# Patient Record
Sex: FEMALE | Race: WHITE | NOT HISPANIC OR LATINO
[De-identification: names, ages, dates, MRNs, and addresses within clinical notes are randomized per-mention and may not be internally consistent; named-entity substitution may affect disease eponyms.]

---

## 2017-08-14 ENCOUNTER — APPOINTMENT (OUTPATIENT)
Dept: OBGYN | Facility: CLINIC | Age: 72
End: 2017-08-14
Payer: MEDICARE

## 2017-08-14 VITALS
SYSTOLIC BLOOD PRESSURE: 134 MMHG | DIASTOLIC BLOOD PRESSURE: 70 MMHG | HEIGHT: 65 IN | BODY MASS INDEX: 24.66 KG/M2 | WEIGHT: 148 LBS

## 2017-08-14 DIAGNOSIS — N95.2 POSTMENOPAUSAL ATROPHIC VAGINITIS: ICD-10-CM

## 2017-08-14 DIAGNOSIS — Z87.448 PERSONAL HISTORY OF OTHER DISEASES OF URINARY SYSTEM: ICD-10-CM

## 2017-08-14 PROCEDURE — 82270 OCCULT BLOOD FECES: CPT

## 2017-08-14 PROCEDURE — G0101: CPT

## 2017-08-14 PROCEDURE — 81003 URINALYSIS AUTO W/O SCOPE: CPT | Mod: QW

## 2017-08-16 LAB
CYTOLOGY CVX/VAG DOC THIN PREP: NORMAL
DATE COLLECTED: NORMAL
HEMOCCULT SP1 STL QL: NEGATIVE
QUALITY CONTROL: YES

## 2018-10-30 ENCOUNTER — APPOINTMENT (OUTPATIENT)
Dept: OBGYN | Facility: CLINIC | Age: 73
End: 2018-10-30
Payer: MEDICARE

## 2018-10-30 VITALS
TEMPERATURE: 98.3 F | DIASTOLIC BLOOD PRESSURE: 70 MMHG | WEIGHT: 157 LBS | OXYGEN SATURATION: 98 % | HEIGHT: 65 IN | SYSTOLIC BLOOD PRESSURE: 134 MMHG | BODY MASS INDEX: 26.16 KG/M2

## 2018-10-30 DIAGNOSIS — N95.2 POSTMENOPAUSAL ATROPHIC VAGINITIS: ICD-10-CM

## 2018-10-30 DIAGNOSIS — N81.11 CYSTOCELE, MIDLINE: ICD-10-CM

## 2018-10-30 LAB
DATE COLLECTED: NORMAL
HEMOCCULT SP1 STL QL: NEGATIVE

## 2018-10-30 PROCEDURE — 99214 OFFICE O/P EST MOD 30 MIN: CPT

## 2018-10-30 PROCEDURE — 82270 OCCULT BLOOD FECES: CPT

## 2018-11-11 LAB — CYTOLOGY CVX/VAG DOC THIN PREP: NORMAL

## 2019-06-30 ENCOUNTER — NON-APPOINTMENT (OUTPATIENT)
Age: 74
End: 2019-06-30

## 2021-05-27 DIAGNOSIS — Z82.49 FAMILY HISTORY OF ISCHEMIC HEART DISEASE AND OTHER DISEASES OF THE CIRCULATORY SYSTEM: ICD-10-CM

## 2021-05-27 DIAGNOSIS — Z92.89 PERSONAL HISTORY OF OTHER MEDICAL TREATMENT: ICD-10-CM

## 2021-05-27 DIAGNOSIS — A04.8 OTHER SPECIFIED BACTERIAL INTESTINAL INFECTIONS: ICD-10-CM

## 2021-05-27 DIAGNOSIS — Z85.828 PERSONAL HISTORY OF OTHER MALIGNANT NEOPLASM OF SKIN: ICD-10-CM

## 2021-05-27 DIAGNOSIS — Z80.3 FAMILY HISTORY OF MALIGNANT NEOPLASM OF BREAST: ICD-10-CM

## 2021-05-27 DIAGNOSIS — Z81.8 FAMILY HISTORY OF OTHER MENTAL AND BEHAVIORAL DISORDERS: ICD-10-CM

## 2021-05-27 DIAGNOSIS — Z83.3 FAMILY HISTORY OF DIABETES MELLITUS: ICD-10-CM

## 2021-05-27 RX ORDER — VITAMIN K2 90 MCG
125 MCG CAPSULE ORAL
Refills: 0 | Status: ACTIVE | COMMUNITY
Start: 2021-05-27

## 2021-07-01 ENCOUNTER — RX RENEWAL (OUTPATIENT)
Age: 76
End: 2021-07-01

## 2021-07-28 ENCOUNTER — NON-APPOINTMENT (OUTPATIENT)
Age: 76
End: 2021-07-28

## 2021-07-29 ENCOUNTER — APPOINTMENT (OUTPATIENT)
Dept: FAMILY MEDICINE | Facility: CLINIC | Age: 76
End: 2021-07-29

## 2021-07-29 ENCOUNTER — RESULT REVIEW (OUTPATIENT)
Age: 76
End: 2021-07-29

## 2021-07-29 ENCOUNTER — INPATIENT (INPATIENT)
Facility: HOSPITAL | Age: 76
LOS: 1 days | Discharge: HOME | DRG: 418 | End: 2021-07-31
Attending: SURGERY | Admitting: SURGERY
Payer: MEDICARE

## 2021-07-29 VITALS
DIASTOLIC BLOOD PRESSURE: 79 MMHG | WEIGHT: 145.06 LBS | HEART RATE: 69 BPM | RESPIRATION RATE: 18 BRPM | TEMPERATURE: 99 F | OXYGEN SATURATION: 95 % | HEIGHT: 65 IN | SYSTOLIC BLOOD PRESSURE: 135 MMHG

## 2021-07-29 DIAGNOSIS — K81.9 CHOLECYSTITIS, UNSPECIFIED: ICD-10-CM

## 2021-07-29 LAB
ALBUMIN SERPL ELPH-MCNC: 2.9 G/DL — LOW (ref 3.3–5)
ALP SERPL-CCNC: 72 U/L — SIGNIFICANT CHANGE UP (ref 40–120)
ALT FLD-CCNC: 21 U/L — SIGNIFICANT CHANGE UP (ref 12–78)
ANION GAP SERPL CALC-SCNC: 8 MMOL/L — SIGNIFICANT CHANGE UP (ref 5–17)
APPEARANCE UR: CLEAR — SIGNIFICANT CHANGE UP
APTT BLD: 30.7 SEC — SIGNIFICANT CHANGE UP (ref 27.5–35.5)
AST SERPL-CCNC: 15 U/L — SIGNIFICANT CHANGE UP (ref 15–37)
BASOPHILS # BLD AUTO: 0.03 K/UL — SIGNIFICANT CHANGE UP (ref 0–0.2)
BASOPHILS NFR BLD AUTO: 0.2 % — SIGNIFICANT CHANGE UP (ref 0–2)
BILIRUB SERPL-MCNC: 1.4 MG/DL — HIGH (ref 0.2–1.2)
BILIRUB UR-MCNC: NEGATIVE — SIGNIFICANT CHANGE UP
BUN SERPL-MCNC: 12 MG/DL — SIGNIFICANT CHANGE UP (ref 7–23)
CALCIUM SERPL-MCNC: 9.1 MG/DL — SIGNIFICANT CHANGE UP (ref 8.5–10.1)
CHLORIDE SERPL-SCNC: 98 MMOL/L — SIGNIFICANT CHANGE UP (ref 96–108)
CO2 SERPL-SCNC: 27 MMOL/L — SIGNIFICANT CHANGE UP (ref 22–31)
COLOR SPEC: YELLOW — SIGNIFICANT CHANGE UP
CREAT SERPL-MCNC: 1.05 MG/DL — SIGNIFICANT CHANGE UP (ref 0.5–1.3)
DIFF PNL FLD: ABNORMAL
EOSINOPHIL # BLD AUTO: 0 K/UL — SIGNIFICANT CHANGE UP (ref 0–0.5)
EOSINOPHIL NFR BLD AUTO: 0 % — SIGNIFICANT CHANGE UP (ref 0–6)
GLUCOSE SERPL-MCNC: 140 MG/DL — HIGH (ref 70–99)
GLUCOSE UR QL: NEGATIVE MG/DL — SIGNIFICANT CHANGE UP
HCT VFR BLD CALC: 46.4 % — HIGH (ref 34.5–45)
HGB BLD-MCNC: 15.8 G/DL — HIGH (ref 11.5–15.5)
IMM GRANULOCYTES NFR BLD AUTO: 0.6 % — SIGNIFICANT CHANGE UP (ref 0–1.5)
INR BLD: 1.18 RATIO — HIGH (ref 0.88–1.16)
KETONES UR-MCNC: ABNORMAL
LACTATE SERPL-SCNC: 1.4 MMOL/L — SIGNIFICANT CHANGE UP (ref 0.7–2)
LEUKOCYTE ESTERASE UR-ACNC: NEGATIVE — SIGNIFICANT CHANGE UP
LIDOCAIN IGE QN: 44 U/L — LOW (ref 73–393)
LYMPHOCYTES # BLD AUTO: 0.49 K/UL — LOW (ref 1–3.3)
LYMPHOCYTES # BLD AUTO: 3.8 % — LOW (ref 13–44)
MCHC RBC-ENTMCNC: 30.6 PG — SIGNIFICANT CHANGE UP (ref 27–34)
MCHC RBC-ENTMCNC: 34.1 GM/DL — SIGNIFICANT CHANGE UP (ref 32–36)
MCV RBC AUTO: 89.9 FL — SIGNIFICANT CHANGE UP (ref 80–100)
MONOCYTES # BLD AUTO: 0.74 K/UL — SIGNIFICANT CHANGE UP (ref 0–0.9)
MONOCYTES NFR BLD AUTO: 5.8 % — SIGNIFICANT CHANGE UP (ref 2–14)
NEUTROPHILS # BLD AUTO: 11.52 K/UL — HIGH (ref 1.8–7.4)
NEUTROPHILS NFR BLD AUTO: 89.6 % — HIGH (ref 43–77)
NITRITE UR-MCNC: NEGATIVE — SIGNIFICANT CHANGE UP
PH UR: 7 — SIGNIFICANT CHANGE UP (ref 5–8)
PLATELET # BLD AUTO: 130 K/UL — LOW (ref 150–400)
POTASSIUM SERPL-MCNC: 3 MMOL/L — LOW (ref 3.5–5.3)
POTASSIUM SERPL-SCNC: 3 MMOL/L — LOW (ref 3.5–5.3)
PROT SERPL-MCNC: 6.8 GM/DL — SIGNIFICANT CHANGE UP (ref 6–8.3)
PROT UR-MCNC: 30 MG/DL
PROTHROM AB SERPL-ACNC: 13.7 SEC — HIGH (ref 10.6–13.6)
RBC # BLD: 5.16 M/UL — SIGNIFICANT CHANGE UP (ref 3.8–5.2)
RBC # FLD: 12.7 % — SIGNIFICANT CHANGE UP (ref 10.3–14.5)
SARS-COV-2 RNA SPEC QL NAA+PROBE: SIGNIFICANT CHANGE UP
SODIUM SERPL-SCNC: 133 MMOL/L — LOW (ref 135–145)
SP GR SPEC: 1.01 — SIGNIFICANT CHANGE UP (ref 1.01–1.02)
TROPONIN I SERPL-MCNC: <0.015 NG/ML — SIGNIFICANT CHANGE UP (ref 0.01–0.04)
UROBILINOGEN FLD QL: 1 MG/DL
WBC # BLD: 12.86 K/UL — HIGH (ref 3.8–10.5)
WBC # FLD AUTO: 12.86 K/UL — HIGH (ref 3.8–10.5)

## 2021-07-29 PROCEDURE — 86769 SARS-COV-2 COVID-19 ANTIBODY: CPT

## 2021-07-29 PROCEDURE — 93010 ELECTROCARDIOGRAM REPORT: CPT

## 2021-07-29 PROCEDURE — 88304 TISSUE EXAM BY PATHOLOGIST: CPT

## 2021-07-29 PROCEDURE — 80053 COMPREHEN METABOLIC PANEL: CPT

## 2021-07-29 PROCEDURE — 85025 COMPLETE CBC W/AUTO DIFF WBC: CPT

## 2021-07-29 PROCEDURE — 86900 BLOOD TYPING SEROLOGIC ABO: CPT

## 2021-07-29 PROCEDURE — 80048 BASIC METABOLIC PNL TOTAL CA: CPT

## 2021-07-29 PROCEDURE — 82248 BILIRUBIN DIRECT: CPT

## 2021-07-29 PROCEDURE — 36415 COLL VENOUS BLD VENIPUNCTURE: CPT

## 2021-07-29 PROCEDURE — 99291 CRITICAL CARE FIRST HOUR: CPT

## 2021-07-29 PROCEDURE — 88304 TISSUE EXAM BY PATHOLOGIST: CPT | Mod: 26

## 2021-07-29 PROCEDURE — 86850 RBC ANTIBODY SCREEN: CPT

## 2021-07-29 PROCEDURE — 74177 CT ABD & PELVIS W/CONTRAST: CPT | Mod: 26,MA

## 2021-07-29 PROCEDURE — 86803 HEPATITIS C AB TEST: CPT

## 2021-07-29 PROCEDURE — 86901 BLOOD TYPING SEROLOGIC RH(D): CPT

## 2021-07-29 RX ORDER — PIPERACILLIN AND TAZOBACTAM 4; .5 G/20ML; G/20ML
3.38 INJECTION, POWDER, LYOPHILIZED, FOR SOLUTION INTRAVENOUS EVERY 8 HOURS
Refills: 0 | Status: DISCONTINUED | OUTPATIENT
Start: 2021-07-29 | End: 2021-07-31

## 2021-07-29 RX ORDER — SODIUM CHLORIDE 9 MG/ML
1000 INJECTION, SOLUTION INTRAVENOUS
Refills: 0 | Status: DISCONTINUED | OUTPATIENT
Start: 2021-07-29 | End: 2021-07-29

## 2021-07-29 RX ORDER — HEPARIN SODIUM 5000 [USP'U]/ML
5000 INJECTION INTRAVENOUS; SUBCUTANEOUS EVERY 8 HOURS
Refills: 0 | Status: DISCONTINUED | OUTPATIENT
Start: 2021-07-29 | End: 2021-07-30

## 2021-07-29 RX ORDER — SODIUM CHLORIDE 9 MG/ML
1000 INJECTION, SOLUTION INTRAVENOUS
Refills: 0 | Status: DISCONTINUED | OUTPATIENT
Start: 2021-07-29 | End: 2021-07-31

## 2021-07-29 RX ORDER — SODIUM CHLORIDE 9 MG/ML
1000 INJECTION INTRAMUSCULAR; INTRAVENOUS; SUBCUTANEOUS ONCE
Refills: 0 | Status: COMPLETED | OUTPATIENT
Start: 2021-07-29 | End: 2021-07-29

## 2021-07-29 RX ORDER — CHOLECALCIFEROL (VITAMIN D3) 125 MCG
1 CAPSULE ORAL
Qty: 0 | Refills: 0 | DISCHARGE

## 2021-07-29 RX ORDER — HYDROMORPHONE HYDROCHLORIDE 2 MG/ML
1 INJECTION INTRAMUSCULAR; INTRAVENOUS; SUBCUTANEOUS EVERY 4 HOURS
Refills: 0 | Status: DISCONTINUED | OUTPATIENT
Start: 2021-07-29 | End: 2021-07-30

## 2021-07-29 RX ORDER — HYDROMORPHONE HYDROCHLORIDE 2 MG/ML
1 INJECTION INTRAMUSCULAR; INTRAVENOUS; SUBCUTANEOUS EVERY 4 HOURS
Refills: 0 | Status: DISCONTINUED | OUTPATIENT
Start: 2021-07-29 | End: 2021-07-31

## 2021-07-29 RX ORDER — ACETAMINOPHEN 500 MG
650 TABLET ORAL EVERY 4 HOURS
Refills: 0 | Status: DISCONTINUED | OUTPATIENT
Start: 2021-07-29 | End: 2021-07-31

## 2021-07-29 RX ORDER — POTASSIUM CHLORIDE 20 MEQ
10 PACKET (EA) ORAL ONCE
Refills: 0 | Status: COMPLETED | OUTPATIENT
Start: 2021-07-29 | End: 2021-07-29

## 2021-07-29 RX ORDER — PIPERACILLIN AND TAZOBACTAM 4; .5 G/20ML; G/20ML
3.38 INJECTION, POWDER, LYOPHILIZED, FOR SOLUTION INTRAVENOUS ONCE
Refills: 0 | Status: COMPLETED | OUTPATIENT
Start: 2021-07-29 | End: 2021-07-29

## 2021-07-29 RX ORDER — FENTANYL CITRATE 50 UG/ML
50 INJECTION INTRAVENOUS ONCE
Refills: 0 | Status: DISCONTINUED | OUTPATIENT
Start: 2021-07-29 | End: 2021-07-29

## 2021-07-29 RX ORDER — OXYCODONE AND ACETAMINOPHEN 5; 325 MG/1; MG/1
1 TABLET ORAL EVERY 4 HOURS
Refills: 0 | Status: DISCONTINUED | OUTPATIENT
Start: 2021-07-29 | End: 2021-07-31

## 2021-07-29 RX ORDER — TRIAMTERENE/HYDROCHLOROTHIAZID 75 MG-50MG
1 TABLET ORAL
Qty: 0 | Refills: 0 | DISCHARGE

## 2021-07-29 RX ORDER — ONDANSETRON 8 MG/1
4 TABLET, FILM COATED ORAL EVERY 6 HOURS
Refills: 0 | Status: DISCONTINUED | OUTPATIENT
Start: 2021-07-29 | End: 2021-07-31

## 2021-07-29 RX ORDER — TRIAMTERENE/HYDROCHLOROTHIAZID 75 MG-50MG
1 TABLET ORAL DAILY
Refills: 0 | Status: DISCONTINUED | OUTPATIENT
Start: 2021-07-29 | End: 2021-07-31

## 2021-07-29 RX ORDER — PIPERACILLIN AND TAZOBACTAM 4; .5 G/20ML; G/20ML
3.38 INJECTION, POWDER, LYOPHILIZED, FOR SOLUTION INTRAVENOUS EVERY 8 HOURS
Refills: 0 | Status: DISCONTINUED | OUTPATIENT
Start: 2021-07-29 | End: 2021-07-29

## 2021-07-29 RX ORDER — ENOXAPARIN SODIUM 100 MG/ML
40 INJECTION SUBCUTANEOUS DAILY
Refills: 0 | Status: DISCONTINUED | OUTPATIENT
Start: 2021-07-29 | End: 2021-07-31

## 2021-07-29 RX ADMIN — FENTANYL CITRATE 50 MICROGRAM(S): 50 INJECTION INTRAVENOUS at 11:54

## 2021-07-29 RX ADMIN — SODIUM CHLORIDE 1000 MILLILITER(S): 9 INJECTION INTRAMUSCULAR; INTRAVENOUS; SUBCUTANEOUS at 11:54

## 2021-07-29 RX ADMIN — PIPERACILLIN AND TAZOBACTAM 25 GRAM(S): 4; .5 INJECTION, POWDER, LYOPHILIZED, FOR SOLUTION INTRAVENOUS at 20:09

## 2021-07-29 RX ADMIN — SODIUM CHLORIDE 75 MILLILITER(S): 9 INJECTION, SOLUTION INTRAVENOUS at 18:48

## 2021-07-29 RX ADMIN — PIPERACILLIN AND TAZOBACTAM 200 GRAM(S): 4; .5 INJECTION, POWDER, LYOPHILIZED, FOR SOLUTION INTRAVENOUS at 11:54

## 2021-07-29 RX ADMIN — FENTANYL CITRATE 50 MICROGRAM(S): 50 INJECTION INTRAVENOUS at 10:49

## 2021-07-29 RX ADMIN — SODIUM CHLORIDE 1000 MILLILITER(S): 9 INJECTION INTRAMUSCULAR; INTRAVENOUS; SUBCUTANEOUS at 10:50

## 2021-07-29 RX ADMIN — Medication 100 MILLIEQUIVALENT(S): at 10:50

## 2021-07-29 RX ADMIN — HEPARIN SODIUM 5000 UNIT(S): 5000 INJECTION INTRAVENOUS; SUBCUTANEOUS at 21:38

## 2021-07-29 RX ADMIN — Medication 10 MILLIEQUIVALENT(S): at 11:54

## 2021-07-29 NOTE — ED PROVIDER NOTE - NS ED ROS FT
Constitutional: No fever or chills  Eyes: No visual changes  HEENT: No throat pain  CV: No chest pain  Resp: No SOB no cough  GI: +abd pain, nausea or vomiting. No diarrhea  : No dysuria  MSK: No musculoskeletal pain  Skin: No rash  Neuro: No headache

## 2021-07-29 NOTE — ED PROVIDER NOTE - OBJECTIVE STATEMENT
76 y/o female with a PMHx of  HTN presents to the ED c/o right sided abd pain since 07/27. States  pain  is on  the right with associated  nausea and vomiting. No bilious or blood in vomit.  Pain was  intermittent but now constant, dull  and non exacerbating with no radiation . Denies prior similar symptoms. Denies diarrhea. No prior abd surgeries. 76 y/o female with a PMHx of  HTN presents to the ED c/o right sided abd pain since 07/27. States  pain  is on  the right with associated  nausea and vomiting. No bilious or blood in vomit.  Pain was  intermittent but now constant, dull  and no exacerbating or alleviating factors, no radiation . Denies prior similar symptoms. Denies diarrhea. No prior abd surgeries.

## 2021-07-29 NOTE — H&P ADULT - HISTORY OF PRESENT ILLNESS
75 yr/old female presents with ruq pain since tuesday evening, pain described as acute, in nature non radiating associated with nausea   denies vomitting,, pain is persistent in nature localizes to right upper quadrant, epigastric region, + decreased appetite

## 2021-07-29 NOTE — ED PROVIDER NOTE - CLINICAL SUMMARY MEDICAL DECISION MAKING FREE TEXT BOX
76 y/o female with a PMHx of  HTN  presents to the ED with abd pain, nausea, vomiting. Will obtain blood work, CT scan and  medication for  pain  and nausea, IV fluids and reassess closely.

## 2021-07-29 NOTE — H&P ADULT - ASSESSMENT
75 yr/old female presents with Acute cholecystis   - npo   - cont zosyn 3.375gms q8hrs  -pain management   - ?possible mrcp  repeat lfts  - will require Lap cholecystectomy  on this admission   - will d/w Dr vides

## 2021-07-29 NOTE — H&P ADULT - NSICDXNOPASTSURGICALHX_GEN_ALL_CORE
Refer To    VASCULAR SURGERY REFER TO:  [facility], Declan Holland MD, Derrickanel Laura MD; [address] [facility], [place], #[phone]; [phone]   Consultation for opinion Referral for treatment   Reason for Referral: PAD (peripheral artery disease) (I73.9)  # of Visits: 4     Signatures   Electronically signed by : Nicky Lopez CMA; Aug 21 2018  5:16PM CST     <-- Click to add NO significant Past Surgical History

## 2021-07-29 NOTE — ED PROVIDER NOTE - CRITICAL CARE ATTENDING CONTRIBUTION TO CARE
Shanti KENT M.D. independently provided 35 minutes of critical care including but not limited to talking to consultants, speaking with patient and family and reviewing lab and radiology results.

## 2021-07-29 NOTE — BRIEF OPERATIVE NOTE - NSICDXBRIEFPREOP_GEN_ALL_CORE_FT
PRE-OP DIAGNOSIS:  Acute cholecystitis with chronic cholecystitis 29-Jul-2021 17:38:42  Nigel Parra

## 2021-07-29 NOTE — H&P ADULT - NSHPLABSRESULTS_GEN_ALL_CORE
15.8   12.86 )-----------( 130      ( 29 Jul 2021 09:10 )             46.4     PT/INR - ( 29 Jul 2021 09:10 )   PT: 13.7 sec;   INR: 1.18 ratio         PTT - ( 29 Jul 2021 09:10 )  PTT:30.7 sec  07-29    133<L>  |  98  |  12  ----------------------------<  140<H>  3.0<L>   |  27  |  1.05    Ca    9.1      29 Jul 2021 09:10    TPro  6.8  /  Alb  2.9<L>  /  TBili  1.4<H>  /  DBili  x   /  AST  15  /  ALT  21  /  AlkPhos  72  07-29    Type and Screen

## 2021-07-29 NOTE — ED PROVIDER NOTE - PHYSICAL EXAMINATION
Constitutional: Elderly female laying in bed in moderate distress. AAOx3  Eyes: PERRLA EOMI  Head: Normocephalic atraumatic  Mouth: MMM  Cardiac: regular rate   Resp: Lungs CTAB  GI: Abd s/, no rebound or guarding. Diffused TTP right periumbilical area, RUQ, RLQ .  Neuro: awake, alert, moving all extremities, cranial nerves 2-12 intact, sensation intact, no dysmetria.  Skin: No rashes Constitutional: Elderly female laying in bed in moderate distress. AAOx3  Eyes: PERRLA EOMI  Head: Normocephalic atraumatic  Mouth: MMM  Cardiac: regular rate   Resp: Lungs CTAB  GI: Abd s/, no rebound or guarding. TTP right periumbilical area, RUQ, RLQ .  Neuro: awake, alert, moving all extremities, cranial nerves 2-12 intact, sensation intact, no dysmetria.  Skin: No rashes

## 2021-07-30 LAB
ALBUMIN SERPL ELPH-MCNC: 2.2 G/DL — LOW (ref 3.3–5)
ALP SERPL-CCNC: 70 U/L — SIGNIFICANT CHANGE UP (ref 40–120)
ALT FLD-CCNC: 30 U/L — SIGNIFICANT CHANGE UP (ref 12–78)
ANION GAP SERPL CALC-SCNC: 5 MMOL/L — SIGNIFICANT CHANGE UP (ref 5–17)
AST SERPL-CCNC: 21 U/L — SIGNIFICANT CHANGE UP (ref 15–37)
BASOPHILS # BLD AUTO: 0.01 K/UL — SIGNIFICANT CHANGE UP (ref 0–0.2)
BASOPHILS NFR BLD AUTO: 0.1 % — SIGNIFICANT CHANGE UP (ref 0–2)
BILIRUB SERPL-MCNC: 0.8 MG/DL — SIGNIFICANT CHANGE UP (ref 0.2–1.2)
BUN SERPL-MCNC: 14 MG/DL — SIGNIFICANT CHANGE UP (ref 7–23)
CALCIUM SERPL-MCNC: 8.4 MG/DL — LOW (ref 8.5–10.1)
CHLORIDE SERPL-SCNC: 102 MMOL/L — SIGNIFICANT CHANGE UP (ref 96–108)
CO2 SERPL-SCNC: 29 MMOL/L — SIGNIFICANT CHANGE UP (ref 22–31)
COVID-19 SPIKE DOMAIN AB INTERP: POSITIVE
COVID-19 SPIKE DOMAIN ANTIBODY RESULT: 79.8 U/ML — HIGH
CREAT SERPL-MCNC: 1.05 MG/DL — SIGNIFICANT CHANGE UP (ref 0.5–1.3)
CULTURE RESULTS: SIGNIFICANT CHANGE UP
EOSINOPHIL # BLD AUTO: 0 K/UL — SIGNIFICANT CHANGE UP (ref 0–0.5)
EOSINOPHIL NFR BLD AUTO: 0 % — SIGNIFICANT CHANGE UP (ref 0–6)
GLUCOSE SERPL-MCNC: 120 MG/DL — HIGH (ref 70–99)
HCT VFR BLD CALC: 39.6 % — SIGNIFICANT CHANGE UP (ref 34.5–45)
HCV AB S/CO SERPL IA: 0.06 S/CO — SIGNIFICANT CHANGE UP (ref 0–0.99)
HCV AB SERPL-IMP: SIGNIFICANT CHANGE UP
HGB BLD-MCNC: 13.1 G/DL — SIGNIFICANT CHANGE UP (ref 11.5–15.5)
IMM GRANULOCYTES NFR BLD AUTO: 0.4 % — SIGNIFICANT CHANGE UP (ref 0–1.5)
LYMPHOCYTES # BLD AUTO: 0.43 K/UL — LOW (ref 1–3.3)
LYMPHOCYTES # BLD AUTO: 4.4 % — LOW (ref 13–44)
MCHC RBC-ENTMCNC: 30 PG — SIGNIFICANT CHANGE UP (ref 27–34)
MCHC RBC-ENTMCNC: 33.1 GM/DL — SIGNIFICANT CHANGE UP (ref 32–36)
MCV RBC AUTO: 90.6 FL — SIGNIFICANT CHANGE UP (ref 80–100)
MONOCYTES # BLD AUTO: 0.47 K/UL — SIGNIFICANT CHANGE UP (ref 0–0.9)
MONOCYTES NFR BLD AUTO: 4.9 % — SIGNIFICANT CHANGE UP (ref 2–14)
NEUTROPHILS # BLD AUTO: 8.72 K/UL — HIGH (ref 1.8–7.4)
NEUTROPHILS NFR BLD AUTO: 90.2 % — HIGH (ref 43–77)
PLATELET # BLD AUTO: 115 K/UL — LOW (ref 150–400)
POTASSIUM SERPL-MCNC: 3.2 MMOL/L — LOW (ref 3.5–5.3)
POTASSIUM SERPL-SCNC: 3.2 MMOL/L — LOW (ref 3.5–5.3)
PROT SERPL-MCNC: 5.8 GM/DL — LOW (ref 6–8.3)
RBC # BLD: 4.37 M/UL — SIGNIFICANT CHANGE UP (ref 3.8–5.2)
RBC # FLD: 12.7 % — SIGNIFICANT CHANGE UP (ref 10.3–14.5)
SARS-COV-2 IGG+IGM SERPL QL IA: 79.8 U/ML — HIGH
SARS-COV-2 IGG+IGM SERPL QL IA: POSITIVE
SODIUM SERPL-SCNC: 136 MMOL/L — SIGNIFICANT CHANGE UP (ref 135–145)
SPECIMEN SOURCE: SIGNIFICANT CHANGE UP
WBC # BLD: 9.67 K/UL — SIGNIFICANT CHANGE UP (ref 3.8–10.5)
WBC # FLD AUTO: 9.67 K/UL — SIGNIFICANT CHANGE UP (ref 3.8–10.5)

## 2021-07-30 RX ORDER — POTASSIUM CHLORIDE 20 MEQ
20 PACKET (EA) ORAL
Refills: 0 | Status: COMPLETED | OUTPATIENT
Start: 2021-07-30 | End: 2021-07-30

## 2021-07-30 RX ADMIN — Medication 20 MILLIEQUIVALENT(S): at 11:04

## 2021-07-30 RX ADMIN — Medication 650 MILLIGRAM(S): at 21:32

## 2021-07-30 RX ADMIN — Medication 650 MILLIGRAM(S): at 07:17

## 2021-07-30 RX ADMIN — PIPERACILLIN AND TAZOBACTAM 25 GRAM(S): 4; .5 INJECTION, POWDER, LYOPHILIZED, FOR SOLUTION INTRAVENOUS at 11:04

## 2021-07-30 RX ADMIN — HEPARIN SODIUM 5000 UNIT(S): 5000 INJECTION INTRAVENOUS; SUBCUTANEOUS at 06:05

## 2021-07-30 RX ADMIN — SODIUM CHLORIDE 75 MILLILITER(S): 9 INJECTION, SOLUTION INTRAVENOUS at 17:23

## 2021-07-30 RX ADMIN — Medication 20 MILLIEQUIVALENT(S): at 17:22

## 2021-07-30 RX ADMIN — PIPERACILLIN AND TAZOBACTAM 25 GRAM(S): 4; .5 INJECTION, POWDER, LYOPHILIZED, FOR SOLUTION INTRAVENOUS at 04:25

## 2021-07-30 RX ADMIN — Medication 1 TABLET(S): at 11:05

## 2021-07-30 RX ADMIN — Medication 650 MILLIGRAM(S): at 06:07

## 2021-07-30 RX ADMIN — ENOXAPARIN SODIUM 40 MILLIGRAM(S): 100 INJECTION SUBCUTANEOUS at 14:15

## 2021-07-30 RX ADMIN — Medication 650 MILLIGRAM(S): at 11:14

## 2021-07-30 RX ADMIN — Medication 20 MILLIEQUIVALENT(S): at 14:15

## 2021-07-30 RX ADMIN — PIPERACILLIN AND TAZOBACTAM 25 GRAM(S): 4; .5 INJECTION, POWDER, LYOPHILIZED, FOR SOLUTION INTRAVENOUS at 21:32

## 2021-07-30 RX ADMIN — Medication 650 MILLIGRAM(S): at 12:10

## 2021-07-30 NOTE — PROGRESS NOTE ADULT - SUBJECTIVE AND OBJECTIVE BOX
Feels well, no complaints    VSS afeb    Abd- lungs- clear  Cor- RRR  Abd- + BS soft on tender, VILMA serosang  Ext- no edema

## 2021-07-31 ENCOUNTER — TRANSCRIPTION ENCOUNTER (OUTPATIENT)
Age: 76
End: 2021-07-31

## 2021-07-31 VITALS
OXYGEN SATURATION: 97 % | SYSTOLIC BLOOD PRESSURE: 114 MMHG | TEMPERATURE: 98 F | RESPIRATION RATE: 18 BRPM | HEART RATE: 54 BPM | DIASTOLIC BLOOD PRESSURE: 64 MMHG

## 2021-07-31 LAB
ANION GAP SERPL CALC-SCNC: 5 MMOL/L — SIGNIFICANT CHANGE UP (ref 5–17)
BASOPHILS # BLD AUTO: 0.01 K/UL — SIGNIFICANT CHANGE UP (ref 0–0.2)
BASOPHILS NFR BLD AUTO: 0.2 % — SIGNIFICANT CHANGE UP (ref 0–2)
BUN SERPL-MCNC: 13 MG/DL — SIGNIFICANT CHANGE UP (ref 7–23)
CALCIUM SERPL-MCNC: 8.2 MG/DL — LOW (ref 8.5–10.1)
CHLORIDE SERPL-SCNC: 104 MMOL/L — SIGNIFICANT CHANGE UP (ref 96–108)
CO2 SERPL-SCNC: 29 MMOL/L — SIGNIFICANT CHANGE UP (ref 22–31)
CREAT SERPL-MCNC: 0.98 MG/DL — SIGNIFICANT CHANGE UP (ref 0.5–1.3)
EOSINOPHIL # BLD AUTO: 0.13 K/UL — SIGNIFICANT CHANGE UP (ref 0–0.5)
EOSINOPHIL NFR BLD AUTO: 2 % — SIGNIFICANT CHANGE UP (ref 0–6)
GLUCOSE SERPL-MCNC: 118 MG/DL — HIGH (ref 70–99)
HCT VFR BLD CALC: 40.6 % — SIGNIFICANT CHANGE UP (ref 34.5–45)
HGB BLD-MCNC: 13.2 G/DL — SIGNIFICANT CHANGE UP (ref 11.5–15.5)
IMM GRANULOCYTES NFR BLD AUTO: 0.5 % — SIGNIFICANT CHANGE UP (ref 0–1.5)
LYMPHOCYTES # BLD AUTO: 1.03 K/UL — SIGNIFICANT CHANGE UP (ref 1–3.3)
LYMPHOCYTES # BLD AUTO: 15.5 % — SIGNIFICANT CHANGE UP (ref 13–44)
MCHC RBC-ENTMCNC: 29.8 PG — SIGNIFICANT CHANGE UP (ref 27–34)
MCHC RBC-ENTMCNC: 32.5 GM/DL — SIGNIFICANT CHANGE UP (ref 32–36)
MCV RBC AUTO: 91.6 FL — SIGNIFICANT CHANGE UP (ref 80–100)
MONOCYTES # BLD AUTO: 0.41 K/UL — SIGNIFICANT CHANGE UP (ref 0–0.9)
MONOCYTES NFR BLD AUTO: 6.2 % — SIGNIFICANT CHANGE UP (ref 2–14)
NEUTROPHILS # BLD AUTO: 5.05 K/UL — SIGNIFICANT CHANGE UP (ref 1.8–7.4)
NEUTROPHILS NFR BLD AUTO: 75.6 % — SIGNIFICANT CHANGE UP (ref 43–77)
PLATELET # BLD AUTO: 129 K/UL — LOW (ref 150–400)
POTASSIUM SERPL-MCNC: 3.2 MMOL/L — LOW (ref 3.5–5.3)
POTASSIUM SERPL-SCNC: 3.2 MMOL/L — LOW (ref 3.5–5.3)
RBC # BLD: 4.43 M/UL — SIGNIFICANT CHANGE UP (ref 3.8–5.2)
RBC # FLD: 12.7 % — SIGNIFICANT CHANGE UP (ref 10.3–14.5)
SODIUM SERPL-SCNC: 138 MMOL/L — SIGNIFICANT CHANGE UP (ref 135–145)
WBC # BLD: 6.66 K/UL — SIGNIFICANT CHANGE UP (ref 3.8–10.5)
WBC # FLD AUTO: 6.66 K/UL — SIGNIFICANT CHANGE UP (ref 3.8–10.5)

## 2021-07-31 PROCEDURE — 12345: CPT | Mod: NC

## 2021-07-31 RX ORDER — ACETAMINOPHEN 500 MG
2 TABLET ORAL
Qty: 0 | Refills: 0 | DISCHARGE
Start: 2021-07-31

## 2021-07-31 RX ADMIN — Medication 1 TABLET(S): at 09:24

## 2021-07-31 RX ADMIN — PIPERACILLIN AND TAZOBACTAM 25 GRAM(S): 4; .5 INJECTION, POWDER, LYOPHILIZED, FOR SOLUTION INTRAVENOUS at 05:52

## 2021-07-31 RX ADMIN — Medication 650 MILLIGRAM(S): at 08:53

## 2021-07-31 RX ADMIN — SODIUM CHLORIDE 75 MILLILITER(S): 9 INJECTION, SOLUTION INTRAVENOUS at 05:52

## 2021-07-31 NOTE — DISCHARGE NOTE PROVIDER - NSDCMRMEDTOKEN_GEN_ALL_CORE_FT
Multiple Vitamins oral tablet: 1 tab(s) orally once a day  triamterene-hydrochlorothiazide 37.5 mg-25 mg oral capsule: 1 cap(s) orally once a day  Vitamin D3 50 mcg (2000 intl units) oral tablet: 1 tab(s) orally once a day

## 2021-07-31 NOTE — CHART NOTE - NSCHARTNOTEFT_GEN_A_CORE
VILMA drain removed from RLQ as per Dr. Parra.  removed VILMA drain without difficulty, no bleeding noted.  4x4s and tegaderm placed.  Pt tolerated procedure well and was left in stable condition.

## 2021-07-31 NOTE — DISCHARGE NOTE PROVIDER - HOSPITAL COURSE
75 yr/old female presents with ruq pain since tuesday evening, pain described as acute, in nature non radiating associated with nausea   denies vomitting,, pain is persistent in nature localizes to right upper quadrant, epigastric region, + decreased appetite. CT scan showed cholecystitis with perforation. She underwent an emergency lap choley. Post op was kept on IV abx. VILMA drain DC'd at discharge

## 2021-08-03 LAB
CULTURE RESULTS: SIGNIFICANT CHANGE UP
CULTURE RESULTS: SIGNIFICANT CHANGE UP
SPECIMEN SOURCE: SIGNIFICANT CHANGE UP
SPECIMEN SOURCE: SIGNIFICANT CHANGE UP

## 2021-08-04 DIAGNOSIS — K80.00 CALCULUS OF GALLBLADDER WITH ACUTE CHOLECYSTITIS WITHOUT OBSTRUCTION: ICD-10-CM

## 2021-08-04 DIAGNOSIS — K82.A1 GANGRENE OF GALLBLADDER IN CHOLECYSTITIS: ICD-10-CM

## 2021-08-04 DIAGNOSIS — Z88.6 ALLERGY STATUS TO ANALGESIC AGENT: ICD-10-CM

## 2021-08-04 DIAGNOSIS — Z88.1 ALLERGY STATUS TO OTHER ANTIBIOTIC AGENTS STATUS: ICD-10-CM

## 2021-08-04 DIAGNOSIS — K82.A2 PERFORATION OF GALLBLADDER IN CHOLECYSTITIS: ICD-10-CM

## 2021-08-04 DIAGNOSIS — I10 ESSENTIAL (PRIMARY) HYPERTENSION: ICD-10-CM

## 2021-09-25 ENCOUNTER — RX RENEWAL (OUTPATIENT)
Age: 76
End: 2021-09-25

## 2021-09-27 PROBLEM — I10 ESSENTIAL (PRIMARY) HYPERTENSION: Chronic | Status: ACTIVE | Noted: 2021-07-29

## 2021-10-28 ENCOUNTER — APPOINTMENT (OUTPATIENT)
Dept: FAMILY MEDICINE | Facility: CLINIC | Age: 76
End: 2021-10-28
Payer: MEDICARE

## 2021-10-28 VITALS
OXYGEN SATURATION: 96 % | WEIGHT: 155 LBS | HEART RATE: 67 BPM | DIASTOLIC BLOOD PRESSURE: 84 MMHG | SYSTOLIC BLOOD PRESSURE: 140 MMHG | HEIGHT: 65 IN | BODY MASS INDEX: 25.83 KG/M2 | TEMPERATURE: 97.8 F

## 2021-10-28 PROCEDURE — 99214 OFFICE O/P EST MOD 30 MIN: CPT | Mod: 25

## 2021-10-28 PROCEDURE — 36415 COLL VENOUS BLD VENIPUNCTURE: CPT

## 2021-10-28 RX ORDER — CHLORHEXIDINE GLUCONATE 4 %
LIQUID (ML) TOPICAL
Refills: 0 | Status: ACTIVE | COMMUNITY
Start: 2021-10-28

## 2021-10-28 RX ORDER — CONJUGATED ESTROGENS 0.62 MG/G
0.62 CREAM VAGINAL
Qty: 1 | Refills: 3 | Status: COMPLETED | COMMUNITY
Start: 2018-10-30 | End: 2021-10-28

## 2021-10-28 NOTE — COUNSELING
[Fall prevention counseling provided] : Fall prevention counseling provided [Behavioral health counseling provided] : Behavioral health counseling provided [Sleep ___ hours/day] : Sleep [unfilled] hours/day [Engage in a relaxing activity] : Engage in a relaxing activity [Plan in advance] : Plan in advance [None] : None [Good understanding] : Patient has a good understanding of lifestyle changes and steps needed to achieve self management goal [de-identified] : Maintain balanced diet of whole grain, fruits and vegetables, lean meats, skinless poultry, fish, beans, soy products, eggs, nuts, and low fat dairy as per FDA dietary guidelines. \par Avoid high calorie/low nutrient foods. \par vegetables/fruits- at least 5 servings/day, \par whole grains- 100% whole grain and at least 3 grams fiber/day.\par reduce/eliminate saturated fat- less than 5% on nutrition labels (ex. nance, deli meat, hot dogs, fried foods, cookies, ice cream, chips, soft drinks, etc.)\par stay within your FDA recommended daily calorie needs or use the plate method to portion intake. \par Avoid fast food and limit eating out. When eating out choose healthy alternatives (ex. grilled, baked, steamed. low fat dressings. avoid french fries).\par DO NOT CONSUME FOODS YOU ARE ALLERGIC TO DESPITE DIETARY RECOMMENDATIONS.\par \par For more information you can visit www.[email or web address] \par \par Incorporate regular physical activity most days of the week. \par Regular aerobic activity- moderate intensity, most days/wk, at least 30min/day- ex. brisk walk 2.5miles/hr, ballroom dancing, water aerobics, light yard work (raking/lawn mowing) actively playing basketball, biking 10miles/hr.\par Muscle strengthening and strength/resistance exercises 2-3days/wk- ex. free weights, resistance bands, your own weight (squats/pull-ups/push-ups).\par Neuro-motor exercises for balance/agility/coordination and flexibility exercises 2-3days/wk, 20-30min/day- ex. yoga and jenny-chi.\par Bone strengthening at least 30min/day, most days of the week- ex. weights, resistance bands, running, brisk walking, jumping rope, stair climbing, tennis.\par Decrease sedentary time. \par LISTEN TO YOUR BODY AND MODIFY ACTIVITY AS NEEDED- DO NOT OVEREXERT YOURSELF DURING PHYSICAL ACTIVITY DESPITE RECOMMENDATION.\par \par For more information you can visit [email or web address] \par \par

## 2021-10-28 NOTE — REVIEW OF SYSTEMS
[Negative] : Heme/Lymph [Hair Changes] : hair changes [Skin Rash] : no skin rash [de-identified] : grieving loss of mother and ill daughter, reports she is coping well and refuses need for further intervention

## 2021-10-28 NOTE — PHYSICAL EXAM
[No Acute Distress] : no acute distress [Well Nourished] : well nourished [Well Developed] : well developed [No Respiratory Distress] : no respiratory distress  [No Accessory Muscle Use] : no accessory muscle use [Clear to Auscultation] : lungs were clear to auscultation bilaterally [No Carotid Bruits] : no carotid bruits [No Edema] : there was no peripheral edema [Soft] : abdomen soft [Non Tender] : non-tender [Non-distended] : non-distended [Normal Posterior Cervical Nodes] : no posterior cervical lymphadenopathy [Normal Anterior Cervical Nodes] : no anterior cervical lymphadenopathy [No Rash] : no rash [Coordination Grossly Intact] : coordination grossly intact [Normal Gait] : normal gait [Normal Mental Status] : the patient's orientation, memory, attention, language and fund of knowledge were normal [Flat] : flat [Impaired judgment] : intact judgment [Impaired Insight] : intact insight [Volume Decrease] : decreased volume [Rate Pressured] : pressured [Normal] : normal throught processes [Normal Associations] :  no deficiency [Delusions] : exhibited no delusions [Hallucinations] : exhibited no hallucinations [Obsessions] : denied obsessions [Preoccupation with Violence] : denied preoccupation with violent thoughts [Suicidal Ideation] : denied suicidal ideation [Suicidal Intent] : denied suicidal intent [Suicidal Plan] : denied suicidal plans [Homicidal Ideation] : denied homicidal ideation [Homicidal Intent] : denied homicidal intention [Homicidal Plan] : denied homicidal plans [de-identified] : hair slightly brittle, thick/evenly distributed, not patchy loss noted

## 2021-10-28 NOTE — HISTORY OF PRESENT ILLNESS
[FreeTextEntry1] : here for f/u chronic concerns [de-identified] : This is a 74yo pmhx htn/ hld/ prediabetes/ Low VitD, here for f/u chronic concerns and recheck blood work. she is also concerned with hair loss since COVID19 infection Dec.2020, she does report life stressor as well which she believes have exacerbated her symptoms, she does reports feeling her symptoms have recently began to improve however she is requesting BW for check and she has dermatology that she is seeing.\par o/w in no acute distress, no other major concerns and reports feeling well. \par will evaluate and manage as appropriate.

## 2021-10-28 NOTE — HEALTH RISK ASSESSMENT
[] : No [No] : In the past 12 months have you used drugs other than those required for medical reasons? No [No falls in past year] : Patient reported no falls in the past year [0] : 2) Feeling down, depressed, or hopeless: Not at all (0) [PHQ-2 Negative - No further assessment needed] : PHQ-2 Negative - No further assessment needed [de-identified] : active [de-identified] : well balanced [[id number]] : 0 [Reviewed no changes] : Reviewed, no changes [Designated Healthcare Proxy] : Designated healthcare proxy [Name: ___] : Health Care Proxy's Name: [unfilled]  [Relationship: ___] : Relationship: [unfilled] [I will adhere to the patient's wishes.] : I will adhere to the patient's wishes. [AdvancecareDate] : 10/21

## 2021-10-28 NOTE — ASSESSMENT
[FreeTextEntry1] : hair loss\par check labs; iron studies, thyroid values, ESR\par in no acute distress and o/w offers no complaints \par continue f/u with dermatology \par patient considering trial Biotin \par \par htn/ hld/ prediabetes\par BP well managed\par on Triamterene-HCTZ\par in no acute distress and o/w offers no complaints \par medication renewal provided as requested \par check lipid panel, a1c, cmp - "labs drawn in office" \par \par LowVitD\par recheck value\par on D3\par conservative mgmt\par in no acute distress and o/w offers no complaints \par \par preventative health\par annual wellness- scheduling \par flu/ shingles/ pna vaccine- refused\par colonoscopy- refuses\par ophthalmology dilated eye exam- 2021\par GYN for Pap- 2019, normal Pap, scheduling \par mammo- completes with GYN\par DEXA- completes with GYN

## 2021-10-29 ENCOUNTER — NON-APPOINTMENT (OUTPATIENT)
Age: 76
End: 2021-10-29

## 2021-10-29 LAB
25(OH)D3 SERPL-MCNC: 50.1 NG/ML
ALBUMIN SERPL ELPH-MCNC: 4.4 G/DL
ALP BLD-CCNC: 82 U/L
ALT SERPL-CCNC: 14 U/L
ANION GAP SERPL CALC-SCNC: 14 MMOL/L
AST SERPL-CCNC: 19 U/L
BASOPHILS # BLD AUTO: 0.04 K/UL
BASOPHILS NFR BLD AUTO: 0.8 %
BILIRUB SERPL-MCNC: 0.6 MG/DL
BUN SERPL-MCNC: 11 MG/DL
CALCIUM SERPL-MCNC: 10.2 MG/DL
CHLORIDE SERPL-SCNC: 99 MMOL/L
CHOLEST SERPL-MCNC: 244 MG/DL
CO2 SERPL-SCNC: 25 MMOL/L
CREAT SERPL-MCNC: 0.98 MG/DL
EOSINOPHIL # BLD AUTO: 0.08 K/UL
EOSINOPHIL NFR BLD AUTO: 1.5 %
ERYTHROCYTE [SEDIMENTATION RATE] IN BLOOD BY WESTERGREN METHOD: 16 MM/HR
ESTIMATED AVERAGE GLUCOSE: 108 MG/DL
FERRITIN SERPL-MCNC: 52 NG/ML
FOLATE SERPL-MCNC: >20 NG/ML
GLUCOSE SERPL-MCNC: 103 MG/DL
HBA1C MFR BLD HPLC: 5.4 %
HCT VFR BLD CALC: 48.5 %
HDLC SERPL-MCNC: 61 MG/DL
HGB BLD-MCNC: 15.6 G/DL
IMM GRANULOCYTES NFR BLD AUTO: 0.2 %
IRON SATN MFR SERPL: 40 %
IRON SERPL-MCNC: 146 UG/DL
LDLC SERPL CALC-MCNC: 131 MG/DL
LYMPHOCYTES # BLD AUTO: 1.11 K/UL
LYMPHOCYTES NFR BLD AUTO: 21 %
MAGNESIUM SERPL-MCNC: 2 MG/DL
MAN DIFF?: NORMAL
MCHC RBC-ENTMCNC: 30.4 PG
MCHC RBC-ENTMCNC: 32.2 GM/DL
MCV RBC AUTO: 94.4 FL
MONOCYTES # BLD AUTO: 0.42 K/UL
MONOCYTES NFR BLD AUTO: 7.9 %
NEUTROPHILS # BLD AUTO: 3.63 K/UL
NEUTROPHILS NFR BLD AUTO: 68.6 %
NONHDLC SERPL-MCNC: 183 MG/DL
PLATELET # BLD AUTO: 184 K/UL
POTASSIUM SERPL-SCNC: 4.1 MMOL/L
PROT SERPL-MCNC: 6.7 G/DL
RBC # BLD: 5.14 M/UL
RBC # FLD: 12.9 %
SODIUM SERPL-SCNC: 139 MMOL/L
T4 FREE SERPL-MCNC: 1.2 NG/DL
TIBC SERPL-MCNC: 370 UG/DL
TRIGL SERPL-MCNC: 261 MG/DL
TSH SERPL-ACNC: 5.38 UIU/ML
UIBC SERPL-MCNC: 223 UG/DL
VIT B12 SERPL-MCNC: 511 PG/ML
WBC # FLD AUTO: 5.29 K/UL

## 2021-11-04 ENCOUNTER — NON-APPOINTMENT (OUTPATIENT)
Age: 76
End: 2021-11-04

## 2021-11-04 LAB — VIT C SERPL-MCNC: 0.3 MG/DL

## 2022-02-24 PROBLEM — Z86.19 HISTORY OF HELICOBACTER PYLORI INFECTION: Status: RESOLVED | Noted: 2022-02-24 | Resolved: 2022-02-24

## 2022-02-24 PROBLEM — K21.9 GASTROESOPHAGEAL REFLUX DISEASE: Noted: 2021-05-27

## 2022-02-28 ENCOUNTER — LABORATORY RESULT (OUTPATIENT)
Age: 77
End: 2022-02-28

## 2022-02-28 ENCOUNTER — NON-APPOINTMENT (OUTPATIENT)
Age: 77
End: 2022-02-28

## 2022-02-28 ENCOUNTER — APPOINTMENT (OUTPATIENT)
Dept: FAMILY MEDICINE | Facility: CLINIC | Age: 77
End: 2022-02-28
Payer: MEDICARE

## 2022-02-28 VITALS
OXYGEN SATURATION: 96 % | WEIGHT: 156 LBS | DIASTOLIC BLOOD PRESSURE: 70 MMHG | HEIGHT: 65 IN | HEART RATE: 64 BPM | BODY MASS INDEX: 25.99 KG/M2 | SYSTOLIC BLOOD PRESSURE: 138 MMHG | TEMPERATURE: 99.7 F

## 2022-02-28 DIAGNOSIS — Z86.19 PERSONAL HISTORY OF OTHER INFECTIOUS AND PARASITIC DISEASES: ICD-10-CM

## 2022-02-28 DIAGNOSIS — K21.9 GASTRO-ESOPHAGEAL REFLUX DISEASE W/OUT ESOPHAGITIS: ICD-10-CM

## 2022-02-28 DIAGNOSIS — I83.893 VARICOSE VEINS OF BILATERAL LOWER EXTREMITIES WITH OTHER COMPLICATIONS: ICD-10-CM

## 2022-02-28 PROCEDURE — 36415 COLL VENOUS BLD VENIPUNCTURE: CPT

## 2022-02-28 PROCEDURE — 93000 ELECTROCARDIOGRAM COMPLETE: CPT

## 2022-02-28 PROCEDURE — G0438: CPT

## 2022-02-28 RX ORDER — MULTIVIT-MIN/FOLIC/VIT K/LYCOP 400-300MCG
1000 TABLET ORAL
Refills: 0 | Status: ACTIVE | COMMUNITY

## 2022-02-28 NOTE — HISTORY OF PRESENT ILLNESS
[de-identified] : Her last PE was 2021\par \par Her last tetanus shot was  Tdap\par Pneumococcal-- has not had\par Shingrix- has not had\par Has not had COVID vacc but had COVID infection 2020 and recovered from this and had good Ab levels checked several times. \par Has not had flu vacc this season\par \par Her diet is clean/healthful usually but off track for much of past year due to life stressors-- planning to get back on track now. \par Exercises regularly-- walking and light weights \par \par Her last colonoscopy/CRC screening was never\par Her last mammogram was 2019\par Her last DEXA was \par Her last gyn exam was 2018 Dr. White\par \par Ms. Fulton has h/o HTN, high chol, IFG, GERD.\par \par She takes her triamterene/HCTZ med consistently and tolerates it well. BPs are at goal on periodic home checks. Usually 120s/70s, occas low 130s, low 80s \par \par Despite efforts with clean eating/exercise her TG and LDL have been persistently elevated (LDL goal <100) and her ASCVD risk level has been elevated >10% for years. She has declined trial of statin med in the past. She was not able to focus on self in past year and would like one more try of working on diet/exercise before discussing statin med trial\par \par R knee meniscus flared up causing leg pain recently and imapcted her ability to exercise for a while. Did PT exercises she remembered from before and this has improved her pain and so will get back on track with exercise now. \par \par +varicose veins are worsening over time and cause some LE edema and heaviness in legs at end of day; better in AM. Tends to sit a lot\par \par Has had a lot of stress in life with health issues in family (mom got ill and  10/2021, daughter has had treatment for cancer and blood clot and zoster in past year). She is using prayer and doing daily walks. Has very supportive family. Does not feel she needs daily SSRI med or formal therapy at this time. \par \par Add vit B12 and vit C after last visit here (for eval of hair loss). This is improving a bit at this point (much less loss and breakage and some regrowth)

## 2022-02-28 NOTE — PLAN
[FreeTextEntry1] : Reviewed age-appropriate preventive screening tests with patient. She is overdue for CRC screening and gyn exam and mammogram and DEXA and she agrees to schedule gyn, mammo, colonoscopy and DEXA for near future. ECG shows nonspecific T wave changes, unchanged from prior ECGs. \par \par Shingrix, PCV 20 and Td/Tdap and flu vacc and COVID vacc briefly revd/recommended. She defers on vaccines for now and can RTO to discuss in more detail if she ever changes her mind. \par \par Check labs today incl COVID Ab level. Cont same meds pending results of labs. BP goal is <=135/85 on avg on home checks and she is at goal on current regimen. \par \par Varicose veins: Try support socks, elevation of legs, calf pump exercises. Consider vasc surg consult for eval and to see if she is candidate for any intervention. Her daughter has been treated by Dr. Jackman so she will see him if she wishes to pursue this eval/intervention (defers for now but will keep in mind). \par \par Again strongly recommended trial of statin med if lipids are again elevated as her ASCVD risk level has been >10% and TG/LDL have been elevated for years with efforts at diet/exercise alone. She would like to try diet/exercise once more for next several months before considering statin med. \par \par Also again recommended she have cardiology evaluation and updated testing (she saw Dr. Mckeon years ago) \par \par Discussed clean eating (eg Mediterranean style eating plan) and regular exercise/staying as physically active as possible.  Include balance exercises and strength training and core strengthening exercises for bone health and to decrease risk for falls. \par \par Reviewed importance of good self care (eg meditation, yoga, adequate rest, regular exercise, magnesium, clean eating etc).\par \par Next CPE in 1 yr. RPA visit (chol, HTN, IFG) and repeat fasting labs in 6 months.

## 2022-02-28 NOTE — PHYSICAL EXAM

## 2022-02-28 NOTE — REVIEW OF SYSTEMS
[Negative] : Heme/Lymph [Lower Ext Edema] : lower extremity edema [Joint Pain] : joint pain [Joint Stiffness] : joint stiffness [Hair Changes] : hair changes [Chest Pain] : no chest pain [Palpitations] : no palpitations [Leg Claudication] : no leg claudication [Itching] : no itching [Nail Changes] : no nail changes [Skin Rash] : no skin rash [FreeTextEntry5] : +varicose veins, some edeam at end of day, better in AM [FreeTextEntry9] : joint pain/stiffness that she is managing with walking, PT exercises for R knee [de-identified] : +incr hair loss in past year, improving with B12 and vit C supplementation

## 2022-02-28 NOTE — HEALTH RISK ASSESSMENT
[0] : 2) Feeling down, depressed, or hopeless: Not at all (0) [PHQ-2 Negative - No further assessment needed] : PHQ-2 Negative - No further assessment needed [[id number]] : 0

## 2022-02-28 NOTE — ASSESSMENT
[FreeTextEntry1] : ENIO YANCEY is a 76 year old female here for a physical exam.  She is also here to follow up on medical issues as noted above.\par

## 2022-03-01 LAB
ALBUMIN SERPL ELPH-MCNC: 4.3 G/DL
ALP BLD-CCNC: 76 U/L
ALT SERPL-CCNC: 12 U/L
ANION GAP SERPL CALC-SCNC: 12 MMOL/L
AST SERPL-CCNC: 21 U/L
BASOPHILS # BLD AUTO: 0.05 K/UL
BASOPHILS NFR BLD AUTO: 0.9 %
BILIRUB SERPL-MCNC: 0.7 MG/DL
BUN SERPL-MCNC: 11 MG/DL
CALCIUM SERPL-MCNC: 9.4 MG/DL
CHLORIDE SERPL-SCNC: 102 MMOL/L
CHOLEST SERPL-MCNC: 217 MG/DL
CO2 SERPL-SCNC: 28 MMOL/L
COVID-19 SPIKE DOMAIN ANTIBODY INTERPRETATION: POSITIVE
CREAT SERPL-MCNC: 1.03 MG/DL
EGFR: 57 ML/MIN/1.73M2
EOSINOPHIL # BLD AUTO: 0.06 K/UL
EOSINOPHIL NFR BLD AUTO: 1.1 %
ESTIMATED AVERAGE GLUCOSE: 114 MG/DL
GLUCOSE SERPL-MCNC: 100 MG/DL
HBA1C MFR BLD HPLC: 5.6 %
HCT VFR BLD CALC: 46.5 %
HDLC SERPL-MCNC: 55 MG/DL
HGB BLD-MCNC: 15.1 G/DL
IMM GRANULOCYTES NFR BLD AUTO: 0.2 %
LDLC SERPL CALC-MCNC: 124 MG/DL
LYMPHOCYTES # BLD AUTO: 1.21 K/UL
LYMPHOCYTES NFR BLD AUTO: 21.8 %
MAN DIFF?: NORMAL
MCHC RBC-ENTMCNC: 30.1 PG
MCHC RBC-ENTMCNC: 32.5 GM/DL
MCV RBC AUTO: 92.6 FL
MONOCYTES # BLD AUTO: 0.43 K/UL
MONOCYTES NFR BLD AUTO: 7.8 %
NEUTROPHILS # BLD AUTO: 3.78 K/UL
NEUTROPHILS NFR BLD AUTO: 68.2 %
NONHDLC SERPL-MCNC: 162 MG/DL
PLATELET # BLD AUTO: 166 K/UL
POTASSIUM SERPL-SCNC: 3.8 MMOL/L
PROT SERPL-MCNC: 6.3 G/DL
RBC # BLD: 5.02 M/UL
RBC # FLD: 13.1 %
SARS-COV-2 AB SERPL IA-ACNC: 108 U/ML
SODIUM SERPL-SCNC: 141 MMOL/L
TRIGL SERPL-MCNC: 192 MG/DL
TSH SERPL-ACNC: 4.43 UIU/ML
WBC # FLD AUTO: 5.54 K/UL

## 2022-03-02 ENCOUNTER — NON-APPOINTMENT (OUTPATIENT)
Age: 77
End: 2022-03-02

## 2022-06-01 ENCOUNTER — RX CHANGE (OUTPATIENT)
Age: 77
End: 2022-06-01

## 2022-06-24 ENCOUNTER — APPOINTMENT (OUTPATIENT)
Dept: FAMILY MEDICINE | Facility: CLINIC | Age: 77
End: 2022-06-24
Payer: MEDICARE

## 2022-06-24 VITALS
BODY MASS INDEX: 25.99 KG/M2 | TEMPERATURE: 99 F | WEIGHT: 156 LBS | DIASTOLIC BLOOD PRESSURE: 80 MMHG | HEIGHT: 65 IN | OXYGEN SATURATION: 95 % | HEART RATE: 88 BPM | SYSTOLIC BLOOD PRESSURE: 136 MMHG

## 2022-06-24 DIAGNOSIS — H00.012 HORDEOLUM EXTERNUM RIGHT LOWER EYELID: ICD-10-CM

## 2022-06-24 DIAGNOSIS — H00.019 HORDEOLUM EXTERNUM UNSPECIFIED EYE, UNSPECIFIED EYELID: ICD-10-CM

## 2022-06-24 PROCEDURE — 99213 OFFICE O/P EST LOW 20 MIN: CPT

## 2022-06-24 NOTE — HISTORY OF PRESENT ILLNESS
[FreeTextEntry8] : Pt is a 77yo female presenting to the office complaining of stye.\par Pt reports redness, swelling of the right lower lateral eyelid that began last night.\par Has mild swelling extending into the infraorbital region.\par Denies drainage from the eye.  Denies eye irritation or redness.\par Denies fever.\par Denies vision changes.\par Pt admits to wearing a different eyeliner a few days prior to symptom onset.\par Pt has history of similar in the past, approximately 2-3 years ago.  Pt was placed on medication with resolution.\par Pt does not follow with ophtho.\par Pt wears glasses for reading/distance, no contact lens use.

## 2022-06-24 NOTE — REVIEW OF SYSTEMS
[Pain] : pain [Redness] : redness [Nasal Discharge] : nasal discharge [Negative] : Psychiatric [Discharge] : no discharge [Dryness] : no dryness  [Vision Problems] : no vision problems [Itching] : no itching

## 2022-06-24 NOTE — ASSESSMENT
[FreeTextEntry1] : Patient is a 77yo female presenting to the office for evaluation of stye.\par \par Stye\par - Warm compresses.\par - Bacitracin topical (allergy to Erythromycin).\par - Keep the area clean and dry, wash with gentle soap and water.\par - Follow up with our office/ophtho if symptoms persist/worsen.\par - Call the office or go to the ED immediately if you develop new, worsening or concerning symptoms including high fever, severe headache/worst headache of your life, confusion, dizziness/lightheadedness, loss of consciousness, severe chest pain, difficulty breathing, shortness of breath, severe abdominal pain, excessive vomiting/diarrhea, inability to feel/move the extremities, or any other concerning symptoms.\par

## 2022-06-24 NOTE — PHYSICAL EXAM
[No Edema] : there was no peripheral edema [Normal] : no rash [Coordination Grossly Intact] : coordination grossly intact [No Focal Deficits] : no focal deficits [Normal Gait] : normal gait [Normal Affect] : the affect was normal [Normal Insight/Judgement] : insight and judgment were intact [de-identified] : mild redness/swelling of the lateral aspect of the right lower eyelid with small head visualized, no active drainage.  small amount of swelling/pink in the infraorbital region which is not warm to touch.

## 2022-08-29 ENCOUNTER — APPOINTMENT (OUTPATIENT)
Dept: FAMILY MEDICINE | Facility: CLINIC | Age: 77
End: 2022-08-29

## 2022-08-29 VITALS
HEIGHT: 65 IN | OXYGEN SATURATION: 96 % | BODY MASS INDEX: 25.99 KG/M2 | HEART RATE: 65 BPM | TEMPERATURE: 97.6 F | DIASTOLIC BLOOD PRESSURE: 82 MMHG | SYSTOLIC BLOOD PRESSURE: 130 MMHG | WEIGHT: 156 LBS

## 2022-08-29 DIAGNOSIS — L65.9 NONSCARRING HAIR LOSS, UNSPECIFIED: ICD-10-CM

## 2022-08-29 DIAGNOSIS — R79.89 OTHER SPECIFIED ABNORMAL FINDINGS OF BLOOD CHEMISTRY: ICD-10-CM

## 2022-08-29 DIAGNOSIS — Z11.59 ENCOUNTER FOR SCREENING FOR OTHER VIRAL DISEASES: ICD-10-CM

## 2022-08-29 PROCEDURE — 36415 COLL VENOUS BLD VENIPUNCTURE: CPT

## 2022-08-29 PROCEDURE — 99214 OFFICE O/P EST MOD 30 MIN: CPT | Mod: 25

## 2022-08-29 RX ORDER — ACETAMINOPHEN 325 MG
TABLET ORAL
Refills: 0 | Status: ACTIVE | COMMUNITY

## 2022-08-29 NOTE — PLAN
[FreeTextEntry1] : Continue all medications as prescribed. Check labs as above bincl B12 and COVID Ab levels. Will adjust any medications based upon lab results.\par \par Shingrix, PCV 20 and Td/Tdap and flu vacc and COVID vacc briefly revd/recommended. She defers on vaccines for now and can RTO to discuss in more detail if she ever changes her mind.\par \par Reminded her that she is due for mammogram and DEXA and she will schedule these for near future. New Rx given\par \par BP goal is <=135/85 on average on home checks. Advised to let us know if BPs are above goal on home checks. \par \par LDL goal <=100 ideally. Reviewed LDL goal and ASCVD risk estimate and factors that go into this calculation.  Reviewed risks/benefits of statin med. Reviewed red yeast rice RYR (600-1200 mg daily) risks/benefits as an alternative to statin meds if not ready to consider statin meds. Recommended excellent hydration +/- co Q10 (100-400 mg daily) to decrease risk for statin (or RYR) related myalgias. \par \par I strongly recommended trial of statin med if lipids are again elevated as her ASCVD risk level has been >10% and TG/LDL have been elevated for years with efforts at diet/exercise alone. She will consider what we discussed and wants to see how these labs look before making a decision about statin med (lilly is on Atorv) \par \par Also again recommended she have cardiology evaluation and updated testing (she saw Dr. Mckeon years ago) and she is considering this \par \par Discussed clean eating (eg Mediterranean style eating plan) and regular exercise/staying as physically active as possible.  Include balance exercises and strength training and core strengthening exercises for bone health and to decrease risk for falls. \par \par Reviewed importance of good self care (e.g. meditation, yoga, adequate rest, regular exercise, magnesium, clean eating, etc.). \par \par Next CPE and RPA visit (chol, HTN, IFG) and repeat fasting labs in 6 months.

## 2022-08-29 NOTE — REVIEW OF SYSTEMS
[Lower Ext Edema] : lower extremity edema [Joint Pain] : joint pain [Joint Stiffness] : joint stiffness [Hair Changes] : hair changes [Negative] : Heme/Lymph [Chest Pain] : no chest pain [Palpitations] : no palpitations [Leg Claudication] : no leg claudication [Itching] : no itching [Nail Changes] : no nail changes [Skin Rash] : no skin rash [FreeTextEntry5] : +varicose veins, some edema at end of day, better in AM [FreeTextEntry9] : joint pain/stiffness that she is managing with walking, keeping as active as she can  [de-identified] : +incr hair loss in past year, improving with B12 and vit C supplementation

## 2022-08-29 NOTE — HEALTH RISK ASSESSMENT
[No falls in past year] : Patient reported no falls in the past year [0] : 2) Feeling down, depressed, or hopeless: Not at all (0) [PHQ-2 Negative - No further assessment needed] : PHQ-2 Negative - No further assessment needed [[id number]] : 0

## 2022-08-29 NOTE — PHYSICAL EXAM
[No Acute Distress] : no acute distress [Well Nourished] : well nourished [Well Developed] : well developed [Well-Appearing] : well-appearing [Normal Sclera/Conjunctiva] : normal sclera/conjunctiva [EOMI] : extraocular movements intact [Normal Outer Ear/Nose] : the outer ears and nose were normal in appearance [No JVD] : no jugular venous distention [No Lymphadenopathy] : no lymphadenopathy [Supple] : supple [Thyroid Normal, No Nodules] : the thyroid was normal and there were no nodules present [No Respiratory Distress] : no respiratory distress  [No Accessory Muscle Use] : no accessory muscle use [Clear to Auscultation] : lungs were clear to auscultation bilaterally [Normal Rate] : normal rate  [Regular Rhythm] : with a regular rhythm [Normal S1, S2] : normal S1 and S2 [No Murmur] : no murmur heard [No Carotid Bruits] : no carotid bruits [Pedal Pulses Present] : the pedal pulses are present [No Edema] : there was no peripheral edema [No Extremity Clubbing/Cyanosis] : no extremity clubbing/cyanosis [Soft] : abdomen soft [Non Tender] : non-tender [Non-distended] : non-distended [No Masses] : no abdominal mass palpated [No HSM] : no HSM [Normal Bowel Sounds] : normal bowel sounds [Normal Posterior Cervical Nodes] : no posterior cervical lymphadenopathy [Normal Anterior Cervical Nodes] : no anterior cervical lymphadenopathy [No Joint Swelling] : no joint swelling [Grossly Normal Strength/Tone] : grossly normal strength/tone [No Rash] : no rash [Coordination Grossly Intact] : coordination grossly intact [No Focal Deficits] : no focal deficits [Normal Gait] : normal gait [Normal Affect] : the affect was normal [Normal Insight/Judgement] : insight and judgment were intact [de-identified] : mod varicosities BLE [de-identified] : no s/sxs acute synovitis

## 2022-08-29 NOTE — HISTORY OF PRESENT ILLNESS
[FreeTextEntry1] : ENIO YANCEY is a 76 year old female here for a follow up visit.\par  [de-identified] : Ms. Fulton has h/o HTN, high chol, IFG, GERD.\par \par She takes her triamterene/HCTZ med consistently and tolerates it well. BPs are at goal on periodic home checks. \par \par Despite efforts with clean eating/exercise her TG and LDL have been persistently elevated (LDL goal <100) and her ASCVD risk level has been elevated >10% for years (ASCVD risk level 22% in 2022). She has declined trial of statin med in the past. In 2022 she opted for one more try of working on diet/exercise before discussing statin med trial. She notes today that perhaps she would be willing to try a statin at this point if the numbers are not at goal \par \par She is eating cleanly overall and exercising regularly and trying to keep as generally active as she can. \par \jonathan Has had a lot of stress in life with health issues in family (mom got ill and  10/2021, daughter is on kidney transplant list). She is using prayer and doing daily walks. Has very supportive family. Does not feel she needs daily SSRI med or formal therapy at this time. \par \jonathan Has had hair loss after COVID. Added Biotin which has helped to stop hair loss but hair has not yet regrown. Increased B12 supplement to try to help as B12 level was lower ideal range (511). Would like B12 level checked with labs \par \par With everything going on in her family she did not yet get to do Mammo or DEXA but she will sched for near future

## 2022-08-29 NOTE — ASSESSMENT
[FreeTextEntry1] : ENIO YANCEY is a 76 year old female here for follow up on medical issues as noted above.\par \par She has a history of hypercholesterolemia, hypertension, impaired fasting glucose, low vitamin D level, and subclinical hypothyroidism\par

## 2022-08-30 LAB
ALBUMIN SERPL ELPH-MCNC: 4.4 G/DL
ALP BLD-CCNC: 82 U/L
ALT SERPL-CCNC: 18 U/L
ANION GAP SERPL CALC-SCNC: 13 MMOL/L
AST SERPL-CCNC: 18 U/L
BILIRUB SERPL-MCNC: 0.6 MG/DL
BUN SERPL-MCNC: 14 MG/DL
CALCIUM SERPL-MCNC: 9.9 MG/DL
CHLORIDE SERPL-SCNC: 101 MMOL/L
CHOLEST SERPL-MCNC: 251 MG/DL
CO2 SERPL-SCNC: 28 MMOL/L
COVID-19 SPIKE DOMAIN ANTIBODY INTERPRETATION: POSITIVE
CREAT SERPL-MCNC: 1.02 MG/DL
EGFR: 58 ML/MIN/1.73M2
ESTIMATED AVERAGE GLUCOSE: 114 MG/DL
GLUCOSE SERPL-MCNC: 101 MG/DL
HBA1C MFR BLD HPLC: 5.6 %
HDLC SERPL-MCNC: 59 MG/DL
LDLC SERPL CALC-MCNC: 153 MG/DL
NONHDLC SERPL-MCNC: 192 MG/DL
POTASSIUM SERPL-SCNC: 4 MMOL/L
PROT SERPL-MCNC: 6.7 G/DL
SARS-COV-2 AB SERPL IA-ACNC: 85.7 U/ML
SODIUM SERPL-SCNC: 142 MMOL/L
TRIGL SERPL-MCNC: 195 MG/DL
TSH SERPL-ACNC: 3.94 UIU/ML
VIT B12 SERPL-MCNC: 1429 PG/ML

## 2023-01-09 ENCOUNTER — APPOINTMENT (OUTPATIENT)
Dept: FAMILY MEDICINE | Facility: CLINIC | Age: 78
End: 2023-01-09
Payer: MEDICARE

## 2023-01-09 PROCEDURE — 36415 COLL VENOUS BLD VENIPUNCTURE: CPT

## 2023-01-10 LAB
ALBUMIN SERPL ELPH-MCNC: 4.3 G/DL
ALP BLD-CCNC: 80 U/L
ALT SERPL-CCNC: 20 U/L
ANION GAP SERPL CALC-SCNC: 12 MMOL/L
AST SERPL-CCNC: 18 U/L
BILIRUB SERPL-MCNC: 0.6 MG/DL
BUN SERPL-MCNC: 12 MG/DL
CALCIUM SERPL-MCNC: 9.6 MG/DL
CHLORIDE SERPL-SCNC: 98 MMOL/L
CHOLEST SERPL-MCNC: 152 MG/DL
CO2 SERPL-SCNC: 30 MMOL/L
CREAT SERPL-MCNC: 0.95 MG/DL
EGFR: 62 ML/MIN/1.73M2
ESTIMATED AVERAGE GLUCOSE: 114 MG/DL
GLUCOSE SERPL-MCNC: 89 MG/DL
HBA1C MFR BLD HPLC: 5.6 %
HDLC SERPL-MCNC: 63 MG/DL
LDLC SERPL CALC-MCNC: 66 MG/DL
NONHDLC SERPL-MCNC: 89 MG/DL
POTASSIUM SERPL-SCNC: 4 MMOL/L
PROT SERPL-MCNC: 6.3 G/DL
SODIUM SERPL-SCNC: 140 MMOL/L
TRIGL SERPL-MCNC: 117 MG/DL

## 2023-01-18 ENCOUNTER — APPOINTMENT (OUTPATIENT)
Dept: FAMILY MEDICINE | Facility: CLINIC | Age: 78
End: 2023-01-18
Payer: MEDICARE

## 2023-01-18 VITALS — SYSTOLIC BLOOD PRESSURE: 128 MMHG | DIASTOLIC BLOOD PRESSURE: 78 MMHG

## 2023-01-18 VITALS
BODY MASS INDEX: 26.33 KG/M2 | TEMPERATURE: 97.3 F | HEIGHT: 65 IN | DIASTOLIC BLOOD PRESSURE: 82 MMHG | HEART RATE: 68 BPM | SYSTOLIC BLOOD PRESSURE: 140 MMHG | WEIGHT: 158 LBS | OXYGEN SATURATION: 97 %

## 2023-01-18 PROCEDURE — 99213 OFFICE O/P EST LOW 20 MIN: CPT

## 2023-01-18 RX ORDER — BACITRACIN 500 [USP'U]/G
500 OINTMENT OPHTHALMIC
Qty: 1 | Refills: 0 | Status: DISCONTINUED | COMMUNITY
Start: 2022-06-24 | End: 2023-01-18

## 2023-01-18 RX ORDER — ERYTHROMYCIN 5 MG/G
5 OINTMENT OPHTHALMIC
Qty: 1 | Refills: 0 | Status: DISCONTINUED | COMMUNITY
Start: 2022-06-24 | End: 2023-01-18

## 2023-01-18 RX ORDER — OMEGA-3S/DHA/EPA/FISH OIL 300-1000MG
CAPSULE ORAL
Refills: 0 | Status: ACTIVE | COMMUNITY

## 2023-01-18 NOTE — HISTORY OF PRESENT ILLNESS
[FreeTextEntry1] : Follow up HTN, HLD, IFG, review labs. [de-identified] : Patient is a 76yo female with PMH HTN, HLD, IFG who presents to the office for follow up. \par \par HTN:  pt currently taking Triamterene-HCTZ 37.5-25mg daily.  Pt does check BP at home, typically 120/70's.  Pt denies HA, dizziness, vision changes, CP, SOB, LE edema. \par \par HLD:  pt currently taking Atorvastatin 10mg daily, started in 08/2022 after b/w showed persistently elevated cholesterol and increased ASCVD risk.  , total cholesterol 251,  in 08/2022. \par  \par B/w done prior and reviewed with pt in detail during today’s visit.\par Pt has significantly improved cholesterol since last check since starting Atorvastatin.  Recommend pt continue this medication as prescribed.  Remainder of labs are WNL.

## 2023-01-18 NOTE — ASSESSMENT
[FreeTextEntry1] : Patient is a 78yo female with PMH HTN, HLD, IFG who presents to the office for follow up. \par \par HTN\par - Initial BP borderline elevated, repeat improved.\par - Continue Triamterene-HCTZ as prescribed.\par - Monitor blood pressure at home, keep log, alert office if too high/too low.\par - DASH diet encouraged, regular exercise encouraged.\par - Alert office if you develop any new, worsening or concerning symptoms including headache, vision changes, dizziness, loss of consciousness, chest pain, shortness of breath, or lower extremity edema.\par \par HLD\par - Reviewed b/w with patient today in office, cholesterol panel significantly improved since starting Atorvastatin.\par - Continue Atorvastatin 10mg daily.\par - Limit/avoid greasy foods, fried foods, fatty foods, and saturated fat in your diet and try and eat more lean proteins.\par \par IFG\par - Fasting glucose improved.\par - Limit/avoid white flour-based carbohydrates (white bread, white rice, pasta, cake, cookies, etc.) and limit added sugar in your diet (sugary drinks, sugar in coffee/tea, alcohol, candy, etc.).\par \par Pt has CPE scheduled with Dr. DANNY Guthrie in 04/2023, discussed with Dr. Guthrie, will push CPE back to August/September since cholesterol levels are now well controlled and tolerating medication well.\par \par Call the office or go to the ED immediately if you develop new, worsening or concerning symptoms including high fever, severe headache/worst headache of your life, confusion, dizziness/lightheadedness, loss of consciousness, severe chest pain, difficulty breathing, shortness of breath, severe abdominal pain, excessive vomiting/diarrhea, inability to feel/move the extremities, or any other concerning symptoms.\par

## 2023-01-18 NOTE — HEALTH RISK ASSESSMENT
[Never] : Never [Yes] : Yes [2 - 4 times a month (2 pts)] : 2-4 times a month (2 points) [1 or 2 (0 pts)] : 1 or 2 (0 points) [Never (0 pts)] : Never (0 points) [No] : In the past 12 months have you used drugs other than those required for medical reasons? No [No falls in past year] : Patient reported no falls in the past year [0] : 2) Feeling down, depressed, or hopeless: Not at all (0) [PHQ-2 Negative - No further assessment needed] : PHQ-2 Negative - No further assessment needed [Audit-CScore] : 2 [de-identified] : Pilates 1x/day, treadmill, light weight training, leg master [de-identified] : well balanced, improved the last few months [[id number]] : 0

## 2023-01-26 ENCOUNTER — FORM ENCOUNTER (OUTPATIENT)
Age: 78
End: 2023-01-26

## 2023-02-06 ENCOUNTER — FORM ENCOUNTER (OUTPATIENT)
Age: 78
End: 2023-02-06

## 2023-02-09 ENCOUNTER — FORM ENCOUNTER (OUTPATIENT)
Age: 78
End: 2023-02-09

## 2023-04-13 ENCOUNTER — APPOINTMENT (OUTPATIENT)
Dept: FAMILY MEDICINE | Facility: CLINIC | Age: 78
End: 2023-04-13
Payer: MEDICARE

## 2023-04-13 PROCEDURE — 36415 COLL VENOUS BLD VENIPUNCTURE: CPT

## 2023-04-14 LAB
ALBUMIN SERPL ELPH-MCNC: 4 G/DL
ALP BLD-CCNC: 77 U/L
ALT SERPL-CCNC: 23 U/L
ANION GAP SERPL CALC-SCNC: 13 MMOL/L
AST SERPL-CCNC: 19 U/L
BILIRUB SERPL-MCNC: 0.7 MG/DL
BUN SERPL-MCNC: 13 MG/DL
CALCIUM SERPL-MCNC: 9.6 MG/DL
CHLORIDE SERPL-SCNC: 100 MMOL/L
CHOLEST SERPL-MCNC: 153 MG/DL
CK SERPL-CCNC: 46 U/L
CO2 SERPL-SCNC: 28 MMOL/L
CREAT SERPL-MCNC: 0.89 MG/DL
EGFR: 68 ML/MIN/1.73M2
GLUCOSE SERPL-MCNC: 100 MG/DL
HDLC SERPL-MCNC: 54 MG/DL
LDLC SERPL CALC-MCNC: 64 MG/DL
NONHDLC SERPL-MCNC: 99 MG/DL
POTASSIUM SERPL-SCNC: 3.8 MMOL/L
PROT SERPL-MCNC: 5.9 G/DL
SODIUM SERPL-SCNC: 140 MMOL/L
TRIGL SERPL-MCNC: 175 MG/DL

## 2023-04-18 ENCOUNTER — NON-APPOINTMENT (OUTPATIENT)
Age: 78
End: 2023-04-18

## 2023-04-20 ENCOUNTER — NON-APPOINTMENT (OUTPATIENT)
Age: 78
End: 2023-04-20

## 2023-04-21 ENCOUNTER — TRANSCRIPTION ENCOUNTER (OUTPATIENT)
Age: 78
End: 2023-04-21

## 2023-04-21 ENCOUNTER — APPOINTMENT (OUTPATIENT)
Dept: FAMILY MEDICINE | Facility: CLINIC | Age: 78
End: 2023-04-21
Payer: MEDICARE

## 2023-04-21 VITALS
BODY MASS INDEX: 25.49 KG/M2 | DIASTOLIC BLOOD PRESSURE: 80 MMHG | HEIGHT: 65 IN | OXYGEN SATURATION: 100 % | SYSTOLIC BLOOD PRESSURE: 150 MMHG | WEIGHT: 153 LBS | TEMPERATURE: 96.4 F | HEART RATE: 87 BPM

## 2023-04-21 VITALS — SYSTOLIC BLOOD PRESSURE: 142 MMHG | DIASTOLIC BLOOD PRESSURE: 78 MMHG

## 2023-04-21 DIAGNOSIS — R25.2 CRAMP AND SPASM: ICD-10-CM

## 2023-04-21 PROCEDURE — 99214 OFFICE O/P EST MOD 30 MIN: CPT

## 2023-04-21 RX ORDER — ATORVASTATIN CALCIUM 10 MG/1
10 TABLET, FILM COATED ORAL
Qty: 90 | Refills: 1 | Status: DISCONTINUED | COMMUNITY
Start: 2022-08-31 | End: 2023-04-21

## 2023-04-21 NOTE — HISTORY OF PRESENT ILLNESS
[FreeTextEntry1] : Follow up HTN, HLD. [de-identified] : Patient is a 76yo female with PMH HTN, HLD, IFG who presents to the office for follow up.  \par \par HTN: pt currently taking Triamterene-HCTZ 37.5-25mg daily. Pt does check BP at home, typically 120/70's. Pt denies HA, dizziness, vision changes, CP, SOB, LE edema.  \par \par HLD: pt was taking Atorvastatin 10mg daily, started in 08/2022 after b/w showed persistently elevated cholesterol and increased ASCVD risk. , total cholesterol 251,  in 08/2022.  B/w improved significantly after starting Atorvastatin.  LDL 66 in 01/2023, 64 in 04/2023. \par \par Pt requested b/w to be done because she is experiencing leg cramping/muscle weakness since starting the Atorvastatin.  Pt states this has been going on for a few weeks.  Pt states she discontinued the statin because of her symptoms.  Stopped about 1 week ago and states she has improvement of symptoms.  B/w performed 4/13/23 overall normal.  CPK normal.  TG and glucose slightly elevated, admits to eating a sugary cookie the night before the blood draw.\par \par Pt seen by Dr. Simpson, Cardiology.  Last visit in 02/2023, had normal work up.

## 2023-04-21 NOTE — HEALTH RISK ASSESSMENT
[Yes] : Yes [2 - 4 times a month (2 pts)] : 2-4 times a month (2 points) [1 or 2 (0 pts)] : 1 or 2 (0 points) [Never (0 pts)] : Never (0 points) [No] : In the past 12 months have you used drugs other than those required for medical reasons? No [No falls in past year] : Patient reported no falls in the past year [0] : 2) Feeling down, depressed, or hopeless: Not at all (0) [PHQ-2 Negative - No further assessment needed] : PHQ-2 Negative - No further assessment needed [Never] : Never [Audit-CScore] : 2 [de-identified] : exercises daily, pilates, walks on treadmill, weight training [de-identified] : well balanced [[id number]] : 0

## 2023-04-21 NOTE — PHYSICAL EXAM
[No Edema] : there was no peripheral edema [Normal] : no rash [Coordination Grossly Intact] : coordination grossly intact [No Focal Deficits] : no focal deficits [Normal Gait] : normal gait [Normal Affect] : the affect was normal [Normal Insight/Judgement] : insight and judgment were intact [de-identified] : no color/size/temperature discrepancy between the bilateral lower extremities, non-tender to palpation

## 2023-04-21 NOTE — ASSESSMENT
[FreeTextEntry1] : Patient is a 76yo female with PMH HTN, HLD, IFG who presents to the office for follow up. \par \par HTN\par - BP slightly elevated in office x2.\par - Reports normal BP at home 120's/70's.\par - Continue Triamterene-HCTZ 37.5-25mg daily.\par - Monitor blood pressure at home, keep log, alert office if too high/too low.\par - DASH diet encouraged, regular exercise encouraged.\par - Alert office if you develop any new, worsening or concerning symptoms including headache, vision changes, dizziness, loss of consciousness, chest pain, shortness of breath, or lower extremity edema.\par \par HLD\par - Pt d/c Atorvastatin 10mg about 1 week ago and reports improvement of leg cramping/weakness.\par - Pt would like to hold off on additional cholesterol-lowering medications at this time.\par - Will start RYR, CoQ10, Fish oil supplements.\par - Has CPE scheduled with Dr. DANNY Guthrie in 08/2023, will repeat labs at that time.\par - Limit/avoid greasy foods, fried foods, fatty foods, and saturated fat in your diet and try and eat more lean proteins.\par - Try and incorporate 30 minutes of aerobic exercise to your daily routine.\par \par Call the office or go to the ED immediately if you develop new, worsening or concerning symptoms including high fever, severe headache/worst headache of your life, confusion, dizziness/lightheadedness, loss of consciousness, severe chest pain, difficulty breathing, shortness of breath, severe abdominal pain, excessive vomiting/diarrhea, inability to feel/move the extremities, or any other concerning symptoms.\par

## 2023-08-18 ENCOUNTER — RX RENEWAL (OUTPATIENT)
Age: 78
End: 2023-08-18

## 2023-08-26 DIAGNOSIS — Z12.39 ENCOUNTER FOR OTHER SCREENING FOR MALIGNANT NEOPLASM OF BREAST: ICD-10-CM

## 2023-08-30 ENCOUNTER — LABORATORY RESULT (OUTPATIENT)
Age: 78
End: 2023-08-30

## 2023-08-30 ENCOUNTER — APPOINTMENT (OUTPATIENT)
Dept: FAMILY MEDICINE | Facility: CLINIC | Age: 78
End: 2023-08-30
Payer: MEDICARE

## 2023-08-30 VITALS
BODY MASS INDEX: 26.82 KG/M2 | WEIGHT: 161 LBS | HEIGHT: 65 IN | TEMPERATURE: 98 F | DIASTOLIC BLOOD PRESSURE: 78 MMHG | HEART RATE: 69 BPM | OXYGEN SATURATION: 95 % | SYSTOLIC BLOOD PRESSURE: 122 MMHG

## 2023-08-30 DIAGNOSIS — Z00.00 ENCOUNTER FOR GENERAL ADULT MEDICAL EXAMINATION W/OUT ABNORMAL FINDINGS: ICD-10-CM

## 2023-08-30 PROCEDURE — 36415 COLL VENOUS BLD VENIPUNCTURE: CPT

## 2023-08-30 PROCEDURE — G0439: CPT

## 2023-08-30 RX ORDER — TIMOLOL MALEATE 2.5 MG/ML
0.25 SOLUTION/ DROPS OPHTHALMIC
Refills: 0 | Status: ACTIVE | COMMUNITY

## 2023-08-30 RX ORDER — CHLORHEXIDINE GLUCONATE 4 %
1000 LIQUID (ML) TOPICAL
Refills: 0 | Status: DISCONTINUED | COMMUNITY
End: 2023-08-30

## 2023-08-30 NOTE — ASSESSMENT
[FreeTextEntry1] : ENIO YANCEY is a 77 year old female here for a physical exam.  She is also here to follow up on medical issues as noted above.

## 2023-08-30 NOTE — PHYSICAL EXAM
[No Acute Distress] : no acute distress [Well Nourished] : well nourished [Well Developed] : well developed [Well-Appearing] : well-appearing [Normal Sclera/Conjunctiva] : normal sclera/conjunctiva [EOMI] : extraocular movements intact [Normal Outer Ear/Nose] : the outer ears and nose were normal in appearance [No JVD] : no jugular venous distention [No Lymphadenopathy] : no lymphadenopathy [Supple] : supple [Thyroid Normal, No Nodules] : the thyroid was normal and there were no nodules present [No Respiratory Distress] : no respiratory distress  [No Accessory Muscle Use] : no accessory muscle use [Clear to Auscultation] : lungs were clear to auscultation bilaterally [Normal Rate] : normal rate  [Regular Rhythm] : with a regular rhythm [Normal S1, S2] : normal S1 and S2 [No Murmur] : no murmur heard [No Carotid Bruits] : no carotid bruits [Pedal Pulses Present] : the pedal pulses are present [No Edema] : there was no peripheral edema [No Extremity Clubbing/Cyanosis] : no extremity clubbing/cyanosis [Soft] : abdomen soft [Non Tender] : non-tender [Non-distended] : non-distended [No Masses] : no abdominal mass palpated [No HSM] : no HSM [Normal Bowel Sounds] : normal bowel sounds [No Joint Swelling] : no joint swelling [Grossly Normal Strength/Tone] : grossly normal strength/tone [No Rash] : no rash [Coordination Grossly Intact] : coordination grossly intact [No Focal Deficits] : no focal deficits [Normal Gait] : normal gait [Normal Affect] : the affect was normal [Normal Insight/Judgement] : insight and judgment were intact [de-identified] : mod varicosities BLE [de-identified] : no s/sxs acute synovitis

## 2023-08-30 NOTE — HISTORY OF PRESENT ILLNESS
[FreeTextEntry1] : ENIO YANCEY is a 77 year old female here for a physical exam. [de-identified] : Her last physical exam was last year  Vaccines: Tetanus is NOT up to date, Tdap in 2012 Pneumococcal vaccination is NOT up to date Shingrix is NOT up to date COVID vaccine is up to date  Her last dentist visit was less than one year ago Her last eye doctor appointment was less than one year ago, Dr. 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 Carreno   GYN visit is NOT up to date, 2018, Dr. White, she has appt sched 9/2023 Mammogram is NOT up to date, 2019 Colon cancer screening is NOT up to date, has never had screening of any type DEXA is NOT up to date, 2012  Her diet is healthy overall, made some positive changes decreased chips, less snacking, hydrating better etc  Exercise: pilates, wgt training, some walking and golfing, strained hamstring a few mos ago and has not been golfing as a result   Ms. Fulton has HTN, high chol, IFG, GERD.  In 8/2022 I recommended trial of statin due to elevated lipids (, ) and 10 yr ASCVD risk level (est around 20%). Mrs. Fulton started Atorvastatin. Repeat lipids 1/2023 were significantly improved/at goal on statin therapy. However due to severe myalgias and wrist and ankle pain and weakness, she had to stop taking statin med in spring 2023; sxs resolved after she stopped statin med. She has been taking RYR and working on clean eating/exercise and is here for repeat labs.   Mrs. Fulton saw Dr. Simpson for card eval/testing in early 2023. ETT was positive so nuclear stress test was done which she reports was fortunately wnl. Echo showed mild LAE, +diastolic dysfunction, nl LVEF 60-65% range, trivial valvular regurgitations.

## 2023-08-30 NOTE — HEALTH RISK ASSESSMENT
[No falls in past year] : Patient reported no falls in the past year [0] : 2) Feeling down, depressed, or hopeless: Not at all (0) [PHQ-2 Negative - No further assessment needed] : PHQ-2 Negative - No further assessment needed [Never] : Never [[id number]] : 0

## 2023-08-30 NOTE — REVIEW OF SYSTEMS
[Lower Ext Edema] : lower extremity edema [Joint Pain] : joint pain [Joint Stiffness] : joint stiffness [Hair Changes] : hair changes [Negative] : Heme/Lymph [Chest Pain] : no chest pain [Palpitations] : no palpitations [Leg Claudication] : no leg claudication [Itching] : no itching [Nail Changes] : no nail changes [Skin Rash] : no skin rash [FreeTextEntry5] : +varicose veins, some edema at end of day, better in AM [FreeTextEntry9] : joint pain/stiffness that she is managing with walking, keeping as active as she can  [de-identified] : +incr hair loss in past year, improving with B12 and vit C supplementation

## 2023-08-30 NOTE — PLAN
[FreeTextEntry1] : Fax labs to Dr. Simpson  Continue all medications as prescribed. Check labs as above. Will adjust any medications based upon lab results.  Reviewed age-appropriate preventive screening tests with patient. Due for gyn exam, mammogram, DEXA and CRC screening and she will schedule for near future. Reviewed pros/cons of Cologuard and Colonoscopy. She will check insurance coverage and consider preferences and let us know if she wants us to order Cologuard test or will schedule appointment with GI. She is willing to do colonoscopy; names given and she will sched   Revd/recommended Tdap booster, PCV 20 and Shingrix series and flu vacc; she defers on all for now  BP goal is <=135/85 on average on home checks. Advised to let us know if BPs are above goal on home checks.   Lifestyle measures to optimize BP reviewed, including regular exercise, adequate sleep, Mediterranean or DASH style clean eating, mindfulness/meditation, magnesium 100-400 mg supplementation, avoid NSAIDS, stress management techniques etc.   Reviewed diagnosis of impaired fasting glucose (pre-diabetes) and risk for progression to diabetes. Reviewed dietary/lifestyle measures that can help to improve glucose and A1C levels over time and decrease risk for progression to diabetes, including eating cleanly (eg Mediterranean style eating plan), limiting/avoiding white flour carbohydrates and added sugars/sweeteners, pairing proteins with carbohydrates, higher fiber intake in diet, exercising regularly including cardio and strength training, achieving and maintaining a normal/near normal weight/BMI etc. We will monitor glucose and HgbA1C trends over time.  LDL goal <=100 ideally. Reviewed LDL goal and ASCVD risk estimate and factors that go into this calculation.  Reviewed risks/benefits of statin med. Reviewed red yeast rice RYR (600-1200 mg daily) risks/benefits as an alternative to statin meds if not ready to consider statin meds. Recommended excellent hydration +/- co Q10 (100-400 mg daily) to decrease risk for statin (or RYR) related myalgias.   Recommend if lipids/ASCVD risk level are again elevated that she try a different statin med and d/c RYR, rao given her FH. We can start with low dose Rosuvastatin eg 5-10 mg daily. If she hydrates very well +/- uses co Q10 I feel she would likely be able to tolerate statin med. She can also d/w Dr. Simpson once she has labs done (we will cc labs to Dr. Simpson) to get her input.   Discussed clean eating (eg Mediterranean style eating plan) and regular exercise/staying as physically active as possible.  Include balance exercises and strength training and core strengthening exercises for bone health and to decrease risk for falls.  Reviewed importance of good self care (e.g. meditation, yoga, adequate rest, regular exercise, magnesium, clean eating, etc.).  Follow up with specialists as recommended by them.   Follow up for next physical in one year. Schedule RPA visit (chol, IFG, HTN etc ) and repeat fasting labs in about 3-6 months.

## 2023-08-30 NOTE — REASON FOR VISIT
[Annual Wellness Visit] : an annual wellness visit
Barthel Index: Feeding Score _10__, Bathing Score _0__, Grooming Score __0_, Dressing Score _5__, Bowels Score _10__, Bladder Score _0__, Toilet Score __5_, Transfers Score _5__, Mobility Score _0__, Stairs Score _0__,     Total Score _35__

## 2023-09-01 DIAGNOSIS — R71.8 OTHER ABNORMALITY OF RED BLOOD CELLS: ICD-10-CM

## 2023-09-01 LAB
ALBUMIN SERPL ELPH-MCNC: 4.3 G/DL
ALP BLD-CCNC: 87 U/L
ALT SERPL-CCNC: 14 U/L
ANION GAP SERPL CALC-SCNC: 14 MMOL/L
AST SERPL-CCNC: 15 U/L
BASOPHILS # BLD AUTO: 0.03 K/UL
BASOPHILS NFR BLD AUTO: 0.5 %
BILIRUB SERPL-MCNC: 0.8 MG/DL
BUN SERPL-MCNC: 12 MG/DL
CALCIUM SERPL-MCNC: 9.4 MG/DL
CHLORIDE SERPL-SCNC: 96 MMOL/L
CHOLEST SERPL-MCNC: 216 MG/DL
CO2 SERPL-SCNC: 25 MMOL/L
CREAT SERPL-MCNC: 0.92 MG/DL
EGFR: 65 ML/MIN/1.73M2
EOSINOPHIL # BLD AUTO: 0.08 K/UL
EOSINOPHIL NFR BLD AUTO: 1.4 %
ESTIMATED AVERAGE GLUCOSE: 120 MG/DL
GLUCOSE SERPL-MCNC: 101 MG/DL
HBA1C MFR BLD HPLC: 5.8 %
HCT VFR BLD CALC: 49.5 %
HDLC SERPL-MCNC: 55 MG/DL
HGB BLD-MCNC: 16.3 G/DL
IMM GRANULOCYTES NFR BLD AUTO: 0.2 %
LDLC SERPL CALC-MCNC: 125 MG/DL
LYMPHOCYTES # BLD AUTO: 1.18 K/UL
LYMPHOCYTES NFR BLD AUTO: 21 %
MAN DIFF?: NORMAL
MCHC RBC-ENTMCNC: 30.1 PG
MCHC RBC-ENTMCNC: 32.9 GM/DL
MCV RBC AUTO: 91.5 FL
MONOCYTES # BLD AUTO: 0.44 K/UL
MONOCYTES NFR BLD AUTO: 7.8 %
NEUTROPHILS # BLD AUTO: 3.87 K/UL
NEUTROPHILS NFR BLD AUTO: 69.1 %
NONHDLC SERPL-MCNC: 161 MG/DL
PLATELET # BLD AUTO: 169 K/UL
POTASSIUM SERPL-SCNC: 3.4 MMOL/L
PROT SERPL-MCNC: 6.5 G/DL
RBC # BLD: 5.41 M/UL
RBC # FLD: 13.2 %
SODIUM SERPL-SCNC: 135 MMOL/L
TRIGL SERPL-MCNC: 209 MG/DL
TSH SERPL-ACNC: 4.77 UIU/ML
WBC # FLD AUTO: 5.61 K/UL

## 2023-09-08 ENCOUNTER — TRANSCRIPTION ENCOUNTER (OUTPATIENT)
Age: 78
End: 2023-09-08

## 2023-09-08 ENCOUNTER — NON-APPOINTMENT (OUTPATIENT)
Age: 78
End: 2023-09-08

## 2023-09-12 ENCOUNTER — APPOINTMENT (OUTPATIENT)
Dept: OBGYN | Facility: CLINIC | Age: 78
End: 2023-09-12
Payer: MEDICARE

## 2023-09-12 VITALS
HEART RATE: 70 BPM | WEIGHT: 160 LBS | RESPIRATION RATE: 18 BRPM | SYSTOLIC BLOOD PRESSURE: 130 MMHG | BODY MASS INDEX: 26.66 KG/M2 | DIASTOLIC BLOOD PRESSURE: 82 MMHG | HEIGHT: 65 IN

## 2023-09-12 DIAGNOSIS — N95.2 POSTMENOPAUSAL ATROPHIC VAGINITIS: ICD-10-CM

## 2023-09-12 DIAGNOSIS — N81.10 CYSTOCELE, UNSPECIFIED: ICD-10-CM

## 2023-09-12 DIAGNOSIS — I10 ESSENTIAL (PRIMARY) HYPERTENSION: ICD-10-CM

## 2023-09-12 LAB
CARD LOT #: NORMAL
CARD LOT EXP DATE: NORMAL
DATE COLLECTED: NORMAL
DATE COLLECTED: NORMAL
DEVELOPER LOT #: NORMAL
DEVELOPER LOT EXP DATE: NORMAL
HEMOCCULT 2: NEGATIVE
HEMOCCULT SP1 STL QL: NEGATIVE
QUALITY CONTROL: YES
QUALITY CONTROL: YES

## 2023-09-12 PROCEDURE — 99204 OFFICE O/P NEW MOD 45 MIN: CPT

## 2023-09-15 LAB — CYTOLOGY CVX/VAG DOC THIN PREP: NORMAL

## 2023-10-02 ENCOUNTER — APPOINTMENT (OUTPATIENT)
Dept: FAMILY MEDICINE | Facility: CLINIC | Age: 78
End: 2023-10-02
Payer: MEDICARE

## 2023-10-02 PROCEDURE — 36415 COLL VENOUS BLD VENIPUNCTURE: CPT

## 2023-10-03 LAB
ANION GAP SERPL CALC-SCNC: 10 MMOL/L
BASOPHILS # BLD AUTO: 0.05 K/UL
BASOPHILS NFR BLD AUTO: 0.8 %
BUN SERPL-MCNC: 13 MG/DL
CALCIUM SERPL-MCNC: 9.2 MG/DL
CHLORIDE SERPL-SCNC: 98 MMOL/L
CO2 SERPL-SCNC: 28 MMOL/L
CREAT SERPL-MCNC: 0.98 MG/DL
EGFR: 60 ML/MIN/1.73M2
EOSINOPHIL # BLD AUTO: 0.09 K/UL
EOSINOPHIL NFR BLD AUTO: 1.5 %
GLUCOSE SERPL-MCNC: 85 MG/DL
HCT VFR BLD CALC: 47.3 %
HGB BLD-MCNC: 15.3 G/DL
IMM GRANULOCYTES NFR BLD AUTO: 0.2 %
LYMPHOCYTES # BLD AUTO: 1.62 K/UL
LYMPHOCYTES NFR BLD AUTO: 27.2 %
MAN DIFF?: NORMAL
MCHC RBC-ENTMCNC: 30.4 PG
MCHC RBC-ENTMCNC: 32.3 GM/DL
MCV RBC AUTO: 94 FL
MONOCYTES # BLD AUTO: 0.49 K/UL
MONOCYTES NFR BLD AUTO: 8.2 %
NEUTROPHILS # BLD AUTO: 3.69 K/UL
NEUTROPHILS NFR BLD AUTO: 62.1 %
PLATELET # BLD AUTO: 182 K/UL
POTASSIUM SERPL-SCNC: 3.7 MMOL/L
RBC # BLD: 5.03 M/UL
RBC # FLD: 13.4 %
SODIUM SERPL-SCNC: 137 MMOL/L
WBC # FLD AUTO: 5.95 K/UL

## 2023-10-13 NOTE — DISCHARGE NOTE PROVIDER - PROVIDER RX CONTACT NUMBER
Patient qualifies for home 02.  Currently no order in the chart.  Case Management will follow up on day of discharge.  May need repeat home 02 evaluation if greater than 48 hours.    Case Management will assist patient with obtaining PCP.  List given to the patient.   [phone]
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
## 2023-11-29 ENCOUNTER — APPOINTMENT (OUTPATIENT)
Dept: FAMILY MEDICINE | Facility: CLINIC | Age: 78
End: 2023-11-29
Payer: MEDICARE

## 2023-11-29 PROCEDURE — 36415 COLL VENOUS BLD VENIPUNCTURE: CPT

## 2023-11-30 LAB
ANION GAP SERPL CALC-SCNC: 11 MMOL/L
BASOPHILS # BLD AUTO: 0.06 K/UL
BASOPHILS NFR BLD AUTO: 0.7 %
BUN SERPL-MCNC: 16 MG/DL
CALCIUM SERPL-MCNC: 9.4 MG/DL
CHLORIDE SERPL-SCNC: 99 MMOL/L
CO2 SERPL-SCNC: 28 MMOL/L
CREAT SERPL-MCNC: 0.93 MG/DL
EGFR: 64 ML/MIN/1.73M2
EOSINOPHIL # BLD AUTO: 0.12 K/UL
EOSINOPHIL NFR BLD AUTO: 1.4 %
FERRITIN SERPL-MCNC: 56 NG/ML
GLUCOSE SERPL-MCNC: 72 MG/DL
HCT VFR BLD CALC: 46.5 %
HGB BLD-MCNC: 15.6 G/DL
IMM GRANULOCYTES NFR BLD AUTO: 0.2 %
IRON SATN MFR SERPL: 32 %
IRON SERPL-MCNC: 111 UG/DL
LYMPHOCYTES # BLD AUTO: 1.99 K/UL
LYMPHOCYTES NFR BLD AUTO: 23.8 %
MAN DIFF?: NORMAL
MCHC RBC-ENTMCNC: 31.3 PG
MCHC RBC-ENTMCNC: 33.5 GM/DL
MCV RBC AUTO: 93.4 FL
MONOCYTES # BLD AUTO: 0.81 K/UL
MONOCYTES NFR BLD AUTO: 9.7 %
NEUTROPHILS # BLD AUTO: 5.35 K/UL
NEUTROPHILS NFR BLD AUTO: 64.2 %
PLATELET # BLD AUTO: 216 K/UL
POTASSIUM SERPL-SCNC: 4 MMOL/L
RBC # BLD: 4.98 M/UL
RBC # FLD: 12.8 %
SODIUM SERPL-SCNC: 138 MMOL/L
TIBC SERPL-MCNC: 351 UG/DL
UIBC SERPL-MCNC: 240 UG/DL
WBC # FLD AUTO: 8.35 K/UL

## 2024-01-01 NOTE — ED PROVIDER NOTE - TIMING
[facility]    Pediatric Gastroenterology Progress Note    Date of Service (when I saw the patient): 2024     Assessment & Plan   Cristobal Barbosa is a 7 month old ELBW SGA male born at 23w1d via  for maternal preeclampsia with severe features. He was admitted to the NICU after birth due to respiratory failure, concern for sepsis and extreme prematurity.     He has many risk factors for cholestasis including: extreme prematurity, concern for active infection, and overall illness. He is off of PN.  Hypothyroidism may be playing a small role in cholestasis as well (less so when under control)  Labs are improving    Evaluation:   -If any acute decompensation/worsening liver US with doppler to assess for any reversal of portal flow that may be contributing to his splenomegaly and thrombocytopenia        Monitoring:  -T/D bilirubin weekly  -ALT/AST and GGT weekly   -Monitor for acholic stools, if present obtain: T/D bili, ALT/AST, GGT, liver US with doppler and notify GI    Intervention:  -Advance feeds as tolerated, agree with Hydrolysate formula given multiple episodes of NEC, I think it is a stretch to blame fortification for the most recent episodes since he was at 30 kcal/oz for 6 day before the episode  -Continue ursodiol 10 mg/kg bid when on 20 mL/kg feeds  -Continue MVW    Rhonda Uribe MD  Pediatric Gastroenterology      Interval History   Bili decreasing    Feed tolerance doing well per nursing, tolerating transition to hydrolysate formula    No recent doppler on US ( or ) does have HSM  MRCP  with HSM normal biliary structures per the team was obtained due to concerns for the HSM and he was getting other imaging at the time    US ()  with rise in bilirubin showed splenomegaly, normal biliary system, normal doppler,  and normal liver parenchyma     Stool color: yellow/brown      Physical Exam   Temp: 97.7  F (36.5  C)  Temp src: Axillary BP: 86/69 Pulse: 128   Resp: 56 SpO2: 97 % O2 Device: BiPAP/CPAP    Vitals:    09/01/24 2000 09/03/24 0100 09/03/24 2000   Weight: 3.72 kg (8 lb 3.2 oz) 3.9 kg (8 lb 9.6 oz) 3.89 kg (8 lb 9.2 oz)     Vital Signs with Ranges  Temp:  [97.7  F (36.5  C)-99  F (37.2  C)] 97.7  F (36.5  C)  Pulse:  [108-145] 128  Resp:  [22-65] 56  BP: (72-86)/(27-69) 86/69  FiO2 (%):  [21 %] 21 %  SpO2:  [93 %-98 %] 97 %  I/O last 3 completed shifts:  In: 580   Out: 436 [Urine:415; Stool:21]    Gen: Sleeping comfortably    HEENT: NCAT, eyes closed, NC and NG in place  ABD: Covered  Remainder of exam deferred due to baby being between cares      Medications   Current Facility-Administered Medications   Medication Dose Route Frequency Provider Last Rate Last Admin     Current Facility-Administered Medications   Medication Dose Route Frequency Provider Last Rate Last Admin    albuterol (PROVENTIL) neb solution 1.25 mg  1.25 mg Nebulization Q12H Amy Barnes APRN CNP   1.25 mg at 09/03/24 1958    budesonide (PULMICORT) neb solution 0.25 mg  0.25 mg Nebulization BID Janet Bailey APRN CNP   0.25 mg at 09/03/24 1959    chlorothiazide (DIURIL) suspension 75 mg  20 mg/kg (Dosing Weight) Oral Q12H Jacque Aguayo CNP   75 mg at 09/04/24 0403    ferrous sulfate (CASTRO-IN-SOL) oral drops 7.8 mg  2 mg/kg/day Oral Q24H Meenakshi Green APRN CNP   7.8 mg at 09/04/24 0749    furosemide (LASIX) solution 8 mg  2 mg/kg Oral Q Mon Wed Fri AM Alvina German PA-C   8 mg at 09/04/24 0749    gabapentin (NEURONTIN) solution 26 mg  7 mg/kg (Dosing Weight) Oral TID Amy Barnes APRN CNP   26 mg at 09/04/24 0749    glycerin (PEDI-LAX) Suppository 0.5 suppository  0.5 suppository Rectal Daily Alvina German PA-C        hydrocortisone (CORTEF) suspension 0.78 mg  0.9 mg/kg/day (Order-Specific) Oral Q6H Dodie Tate APRN CNP   0.78 mg at 09/04/24 0529    levothyroxine 20 mcg/mL (THYQUIDITY) oral  solution 35 mcg  35 mcg Oral Q24H Jacque Aguayo CNP   35 mcg at 09/04/24 0749    methadone (DOLOPHINE) solution 0.2 mg  0.2 mg Oral Q6H Akilah Flores CNP   0.2 mg at 09/04/24 0157    mvw complete formulation (PEDIATRIC) oral solution 0.3 mL  0.3 mL Oral Daily eMenakshi Green APRN CNP   0.3 mL at 09/03/24 2016    potassium chloride oral solution 2.805 mEq  3 mEq/kg/day (Dosing Weight) Oral 4x Daily Meenakshi Green APRN CNP   2.805 mEq at 09/04/24 0749    ursodiol (ACTIGALL) suspension 38 mg  10 mg/kg (Dosing Weight) Oral Q12H Kacie Gamez PA-C   38 mg at 09/04/24 0749       Data   Labs reviewed in Epic including:  Liver Function Studies:  Recent Labs   Lab Test 09/02/24 0429 08/26/24  0451 08/19/24  0232 08/12/24  0406 08/05/24  0600 03/22/24  0540 03/17/24  0615 03/08/24  0612 03/04/24  0553   PROTTOTAL  --   --   --   --   --   --  2.8*  --   --    ALBUMIN  --   --   --   --   --   --  1.8*  --  1.6*   ALKPHOS 489* 686* 796* 877* 736*   < > 423*   < >  --    * 196* 301* 360* 163*   < > 103*   < >  --    ALT 87* 134* 164* 150* 72*   < > 19   < >  --    * 197* 218* 373* 195*   < >  --    < >  --     < > = values in this interval not displayed.       Bilirubin:  Recent Labs   Lab Test 09/02/24 0429 08/29/24  0642 08/26/24  0451 08/23/24  0330 08/19/24  0232   BILITOTAL 3.2* 3.7* 4.4* 4.9* 6.5*   DBIL 2.26* 2.68* 2.98* 3.52* 4.38*       Coags:  Recent Labs   Lab Test 08/01/24  0606 07/30/24  1849 07/18/24  0429 06/14/24  0835   INR 1.06 1.06 1.10 1.11   PTT 32 34  --  41        constant

## 2024-02-02 ENCOUNTER — APPOINTMENT (OUTPATIENT)
Dept: FAMILY MEDICINE | Facility: CLINIC | Age: 79
End: 2024-02-02
Payer: MEDICARE

## 2024-02-02 PROCEDURE — 36415 COLL VENOUS BLD VENIPUNCTURE: CPT

## 2024-02-04 LAB
ALBUMIN SERPL ELPH-MCNC: 4.1 G/DL
ALP BLD-CCNC: 79 U/L
ALT SERPL-CCNC: 19 U/L
ANION GAP SERPL CALC-SCNC: 13 MMOL/L
AST SERPL-CCNC: 15 U/L
BASOPHILS # BLD AUTO: 0.05 K/UL
BASOPHILS NFR BLD AUTO: 0.8 %
BILIRUB SERPL-MCNC: 0.6 MG/DL
BUN SERPL-MCNC: 21 MG/DL
CALCIUM SERPL-MCNC: 9.4 MG/DL
CHLORIDE SERPL-SCNC: 100 MMOL/L
CHOLEST SERPL-MCNC: 230 MG/DL
CO2 SERPL-SCNC: 28 MMOL/L
CREAT SERPL-MCNC: 1.38 MG/DL
EGFR: 40 ML/MIN/1.73M2
EOSINOPHIL # BLD AUTO: 0.1 K/UL
EOSINOPHIL NFR BLD AUTO: 1.6 %
ESTIMATED AVERAGE GLUCOSE: 120 MG/DL
GLUCOSE SERPL-MCNC: 100 MG/DL
HBA1C MFR BLD HPLC: 5.8 %
HCT VFR BLD CALC: 44.6 %
HDLC SERPL-MCNC: 47 MG/DL
HGB BLD-MCNC: 15.2 G/DL
IMM GRANULOCYTES NFR BLD AUTO: 0.5 %
LDLC SERPL CALC-MCNC: 133 MG/DL
LYMPHOCYTES # BLD AUTO: 1.45 K/UL
LYMPHOCYTES NFR BLD AUTO: 23 %
MAN DIFF?: NORMAL
MCHC RBC-ENTMCNC: 29.9 PG
MCHC RBC-ENTMCNC: 34.1 GM/DL
MCV RBC AUTO: 87.6 FL
MONOCYTES # BLD AUTO: 0.48 K/UL
MONOCYTES NFR BLD AUTO: 7.6 %
NEUTROPHILS # BLD AUTO: 4.2 K/UL
NEUTROPHILS NFR BLD AUTO: 66.5 %
NONHDLC SERPL-MCNC: 184 MG/DL
PLATELET # BLD AUTO: 184 K/UL
POTASSIUM SERPL-SCNC: 3.7 MMOL/L
PROT SERPL-MCNC: 6.5 G/DL
RBC # BLD: 5.09 M/UL
RBC # FLD: 12.5 %
SODIUM SERPL-SCNC: 140 MMOL/L
TRIGL SERPL-MCNC: 281 MG/DL
TSH SERPL-ACNC: 3.71 UIU/ML
WBC # FLD AUTO: 6.31 K/UL

## 2024-02-09 ENCOUNTER — APPOINTMENT (OUTPATIENT)
Dept: FAMILY MEDICINE | Facility: CLINIC | Age: 79
End: 2024-02-09
Payer: MEDICARE

## 2024-02-09 VITALS — SYSTOLIC BLOOD PRESSURE: 138 MMHG | DIASTOLIC BLOOD PRESSURE: 84 MMHG

## 2024-02-09 VITALS
HEIGHT: 65 IN | WEIGHT: 156 LBS | OXYGEN SATURATION: 95 % | HEART RATE: 54 BPM | BODY MASS INDEX: 25.99 KG/M2 | TEMPERATURE: 97.4 F

## 2024-02-09 DIAGNOSIS — M54.9 DORSALGIA, UNSPECIFIED: ICD-10-CM

## 2024-02-09 DIAGNOSIS — C43.9 MALIGNANT MELANOMA OF SKIN, UNSPECIFIED: ICD-10-CM

## 2024-02-09 PROCEDURE — G2211 COMPLEX E/M VISIT ADD ON: CPT

## 2024-02-09 PROCEDURE — 36415 COLL VENOUS BLD VENIPUNCTURE: CPT

## 2024-02-09 PROCEDURE — 99215 OFFICE O/P EST HI 40 MIN: CPT

## 2024-02-09 RX ORDER — ROSUVASTATIN CALCIUM 5 MG/1
5 TABLET, FILM COATED ORAL
Qty: 90 | Refills: 3 | Status: DISCONTINUED | COMMUNITY
Start: 2023-09-08 | End: 2024-02-09

## 2024-02-09 NOTE — ASSESSMENT
[FreeTextEntry1] : ENIO YANCEY is a 78 year old female here for follow up on medical issues as noted above.  L shoulder/upper neck pain. More likely muscular than intraarticular though we disc likely she has some cervical OA and/or L shoulder OA that could be contributing. Disc options incl XR, PT, ortho referral, and incr self care measures (massage, topical salves, pilates, ROMM exercises etc) as well as can consider acupuncture, massage etc. She opts to get back on track with her Pilates and self care measures/conservative care for now and she will let me know if needs/wants Rx for PT/XR (L shoulder, C spine) and /or ortho referral, or if any incr/new sxs

## 2024-02-09 NOTE — HEALTH RISK ASSESSMENT
[No falls in past year] : Patient reported no falls in the past year [0] : 2) Feeling down, depressed, or hopeless: Not at all (0) [PHQ-2 Negative - No further assessment needed] : PHQ-2 Negative - No further assessment needed [Never] : Never [[id number]] : 0

## 2024-02-09 NOTE — HISTORY OF PRESENT ILLNESS
[FreeTextEntry1] : ENIO YANCEY is a 78 year old female here for a follow up visit. [de-identified] : Ms. Fulton has HTN, high cholesterol, IFG, GERD. She also has abnormal renal function (elevated creatinine, low GFR) on these labs.   She is UTD on follow up and recommended testing with Dr. Simpson (card).   Mrs. Fulton has not been able to tolerate statin meds in the past (Atorvastatin). After her 8/2023 visit based on elevated lipids (, , HDL 55, ) I recommended she try 5 mg Rosuvastatin to see if able to tolerate. Unfortunately she was not able to tolerate as caused severe muscle aches so had to stop. Has not tried other statin meds but is not sure she is willing to at this time  She repeated labs prior to this visit: , , HDL 47, ; 10 yr ASCVD risk est at 23.86% . H/H improved to wnl with better hydration so she should continue good daily hydration. Glu is 100.  A1C is 5.8, stable. Creatinine 1.38 GFR 40 and that is worse than usual for her. Rest of labs are wnl.  At time of labs she had been recently ill with resp infection that caused poor appetite and was not eating/hydrating well so that may be why renal function on thos elab results. Does not use NSAIDs reg, only very rarely and had 1 in past few weeks.   BPs at home have been excellent 122/77 this AM   Sees Dr. Carreno for derm visits. Has had several Mohs procedures in past year R inner forearm and R post calf. Dr Galvan (mohs surg) and plastic surgeon Dr. Mike  Had a fall 10-12 yrs ago onto L shoulder. Has had post L should pain on and off since then, worsening over past year, rao after moving a piece of heavy furniture (loveseat). Feels pain post neck and L shoulder and radiates down back of arm . Has been off track with her pilates which usually helps . Using Theragun on post upper back helps signif. No CP and is positional and pain is reproduced with palpating trapezius mm. Does not feel is cardiac in nature . No weakness UE

## 2024-02-09 NOTE — PLAN
[FreeTextEntry1] : Reviewed labs done prior to visit as noted above. Lipids are worse. A1C stable and only mildly in IFG range. H/H improved and now normal. Creatinine and GFR are abnormal and significantly worse than her usualrelated to suboptimal hydration perhaps though that seems less likely given that the H/H are improved. Rest of labs are wnl. We faxed labs to Dr. Simpson  She is hydrating better in past week and extra well today and is not fasting and we will repeat BMP today to recheck create and GFR levels. Recommend getting/keeping Bps and lipids optimized, hydrate well, avoid NSAIDs. Will track renal function for now and is signif worsening over time will refer to nephrology for further eval and testing. Also disc that if repeat labs are abnormal will order renal US for further eval. Also revd that if renal function is again abnormal on repeat labs I recommend she d/c diuretic med (Triamterene/HCTZ) and that she try low dose ARB med (given IFG) with close monitoring of renal function, and if not able t tolerate ARB med will instead use amlodipine for HTN.   Continue all medications as prescribed, pending results of repeat BMP and pending her conservation with Dr. Simpson re chol treatment plan.   Revd/recommended Tdap booster, PCV 20 and Shingrix series and flu vacc; she defers on all for now  BP goal is <=135/85 on average on home checks. Advised to let us know if BPs are above goal on home checks.  Lifestyle measures to optimize BP reviewed, including regular exercise, adequate sleep, Mediterranean or DASH style clean eating, mindfulness/meditation, magnesium 100-400 mg supplementation, avoid NSAIDS, stress management techniques etc.  Reviewed diagnosis of impaired fasting glucose (pre-diabetes) and risk for progression to diabetes. Reviewed dietary/lifestyle measures that can help to improve glucose and A1C levels over time and decrease risk for progression to diabetes, including eating cleanly (eg Mediterranean style eating plan), limiting/avoiding white flour carbohydrates and added sugars/sweeteners, pairing proteins with carbohydrates, higher fiber intake in diet, exercising regularly including cardio and strength training, achieving and maintaining a normal/near normal weight/BMI etc. We will monitor glucose and HgbA1C trends over time.  LDL goal <=100 ideally. Reviewed LDL goal and ASCVD risk estimate and factors that go into this calculation. Reviewed risks/benefits of statin med. Reviewed red yeast rice RYR (600-1200 mg daily) risks/benefits as an alternative to statin meds if not ready to consider statin meds. Recommended excellent hydration +/- co Q10 (100-400 mg daily) to decrease risk for statin (or RYR) related myalgias.  Reviewed that her lipids and ASCVD risk levels are in ranges where cholesterol lowering therapy is strongly recommended. Given her intolerance to even low doses of several different statin meds I recommend she d/w Dr. Simpson re statin alternatives, such as PCSK 9 inhibitor meds. She would like to give RYR a try before considering another statin med or different Rx med.   Discussed clean eating (eg Mediterranean style plant based eating plan) and regular exercise/staying as physically active as possible. Include balance exercises and strength training and core strengthening exercises for bone health and to decrease risk for falls.  Recommended Tylenol XS or Arthritis 1-2 pills BID-TID if helpful, avoid NSAIDs given age/HTN/renal functionand revd r/b/se of NSAIDs incl CV, renal and GI etc, regular stretching, heat/ice prn, consider turmeric supplementation, consider CBD cream or oral options, gentle yoga/chair yoga, Pilates, strengthen core muscles, consider chiro and/or massage and/or acupuncture. If these measures are not helpful enough then consider consultation with ortho and/or pain  for further eval and treatment.   Recommended heat, stretching, topical salves, ROM exercises, good ergonomics with screens and good posture, gentle yoga, massage, can consider chiropractic intervention if desired/helpful, can consider acupuncture/cupping etc. If symptoms are not improving/resolving with these measures then can consider trial of PT and/or see orthopedist for further evaluation and treatment.  Reviewed importance of good self care (e.g. meditation, yoga, adequate rest, regular exercise, magnesium, clean eating, etc.).  Follow up with specialists as recommended by them.  Schedule CPE and RPA visit (chol, IFG, HTN etc ) and repeat fasting labs in about 6 months. If repeat renal function is abnormal will also do renal function/HTN f/u visit in 1 mo and prn  Face-to-face time spent with patient, over half in discussion of the above diagnoses and treatment plan: 40 minutes.

## 2024-02-09 NOTE — REVIEW OF SYSTEMS
[Lower Ext Edema] : lower extremity edema [Joint Pain] : joint pain [Joint Stiffness] : joint stiffness [Chest Pain] : no chest pain [Palpitations] : no palpitations [Leg Claudication] : no leg claudication [Muscle Pain] : muscle pain [Back Pain] : back pain [Negative] : Integumentary [FreeTextEntry5] : +varicose veins, some edema at end of day, better in AM [FreeTextEntry9] : joint pain/stiffness that she is managing with walking, keeping as active as she can ; L upper back/post shoulder pain as noted in HPI [de-identified] : s/p several mohs surgeries and excision of an early stage melanoma

## 2024-02-13 LAB
ANION GAP SERPL CALC-SCNC: 12 MMOL/L
BUN SERPL-MCNC: 19 MG/DL
CALCIUM SERPL-MCNC: 9.8 MG/DL
CHLORIDE SERPL-SCNC: 98 MMOL/L
CO2 SERPL-SCNC: 28 MMOL/L
CREAT SERPL-MCNC: 1.4 MG/DL
EGFR: 39 ML/MIN/1.73M2
GLUCOSE SERPL-MCNC: 86 MG/DL
POTASSIUM SERPL-SCNC: 3.6 MMOL/L
SODIUM SERPL-SCNC: 138 MMOL/L

## 2024-02-14 ENCOUNTER — RX RENEWAL (OUTPATIENT)
Age: 79
End: 2024-02-14

## 2024-02-21 ENCOUNTER — APPOINTMENT (OUTPATIENT)
Dept: FAMILY MEDICINE | Facility: CLINIC | Age: 79
End: 2024-02-21
Payer: MEDICARE

## 2024-02-21 VITALS
OXYGEN SATURATION: 96 % | DIASTOLIC BLOOD PRESSURE: 72 MMHG | HEART RATE: 74 BPM | HEIGHT: 65 IN | SYSTOLIC BLOOD PRESSURE: 162 MMHG | BODY MASS INDEX: 26.99 KG/M2 | TEMPERATURE: 97.2 F | WEIGHT: 162 LBS

## 2024-02-21 DIAGNOSIS — E87.6 HYPOKALEMIA: ICD-10-CM

## 2024-02-21 PROCEDURE — 36415 COLL VENOUS BLD VENIPUNCTURE: CPT

## 2024-02-21 PROCEDURE — 99213 OFFICE O/P EST LOW 20 MIN: CPT

## 2024-02-21 RX ORDER — TRIAMTERENE AND HYDROCHLOROTHIAZIDE 37.5; 25 MG/1; MG/1
37.5-25 CAPSULE ORAL
Qty: 90 | Refills: 3 | Status: COMPLETED | COMMUNITY
Start: 2021-07-01 | End: 2024-02-21

## 2024-02-22 LAB
ALBUMIN SERPL ELPH-MCNC: 3.8 G/DL
ALP BLD-CCNC: 71 U/L
ALT SERPL-CCNC: 18 U/L
ANION GAP SERPL CALC-SCNC: 10 MMOL/L
APPEARANCE: CLEAR
AST SERPL-CCNC: 15 U/L
BILIRUB SERPL-MCNC: 0.5 MG/DL
BILIRUBIN URINE: NEGATIVE
BLOOD URINE: NEGATIVE
BUN SERPL-MCNC: 10 MG/DL
CALCIUM SERPL-MCNC: 8.9 MG/DL
CHLORIDE SERPL-SCNC: 108 MMOL/L
CO2 SERPL-SCNC: 26 MMOL/L
COLOR: YELLOW
CREAT SERPL-MCNC: 1.03 MG/DL
CREAT SPEC-SCNC: 46 MG/DL
EGFR: 56 ML/MIN/1.73M2
GLUCOSE QUALITATIVE U: NEGATIVE MG/DL
GLUCOSE SERPL-MCNC: 98 MG/DL
KETONES URINE: NEGATIVE MG/DL
LEUKOCYTE ESTERASE URINE: NEGATIVE
MICROALBUMIN 24H UR DL<=1MG/L-MCNC: <1.2 MG/DL
MICROALBUMIN/CREAT 24H UR-RTO: NORMAL MG/G
NITRITE URINE: NEGATIVE
PH URINE: 6
POTASSIUM SERPL-SCNC: 3.5 MMOL/L
PROT SERPL-MCNC: 5.8 G/DL
PROTEIN URINE: NEGATIVE MG/DL
SODIUM SERPL-SCNC: 144 MMOL/L
SPECIFIC GRAVITY URINE: 1.01
UROBILINOGEN URINE: 0.2 MG/DL

## 2024-03-13 ENCOUNTER — APPOINTMENT (OUTPATIENT)
Dept: FAMILY MEDICINE | Facility: CLINIC | Age: 79
End: 2024-03-13
Payer: MEDICARE

## 2024-03-13 PROCEDURE — 36415 COLL VENOUS BLD VENIPUNCTURE: CPT

## 2024-03-14 LAB
ANION GAP SERPL CALC-SCNC: 9 MMOL/L
BUN SERPL-MCNC: 13 MG/DL
CALCIUM SERPL-MCNC: 9.3 MG/DL
CHLORIDE SERPL-SCNC: 101 MMOL/L
CO2 SERPL-SCNC: 29 MMOL/L
CREAT SERPL-MCNC: 1.12 MG/DL
EGFR: 51 ML/MIN/1.73M2
GLUCOSE SERPL-MCNC: 74 MG/DL
POTASSIUM SERPL-SCNC: 3.9 MMOL/L
SODIUM SERPL-SCNC: 140 MMOL/L

## 2024-04-01 ENCOUNTER — RX RENEWAL (OUTPATIENT)
Age: 79
End: 2024-04-01

## 2024-04-04 ENCOUNTER — APPOINTMENT (OUTPATIENT)
Dept: FAMILY MEDICINE | Facility: CLINIC | Age: 79
End: 2024-04-04
Payer: MEDICARE

## 2024-04-04 PROCEDURE — 36415 COLL VENOUS BLD VENIPUNCTURE: CPT

## 2024-04-06 LAB
ANION GAP SERPL CALC-SCNC: 11 MMOL/L
BUN SERPL-MCNC: 12 MG/DL
CALCIUM SERPL-MCNC: 9.3 MG/DL
CHLORIDE SERPL-SCNC: 96 MMOL/L
CO2 SERPL-SCNC: 30 MMOL/L
CREAT SERPL-MCNC: 1 MG/DL
EGFR: 58 ML/MIN/1.73M2
GLUCOSE SERPL-MCNC: 84 MG/DL
POTASSIUM SERPL-SCNC: 3.6 MMOL/L
SODIUM SERPL-SCNC: 137 MMOL/L

## 2024-04-10 ENCOUNTER — RX RENEWAL (OUTPATIENT)
Age: 79
End: 2024-04-10

## 2024-06-23 PROBLEM — I10 HYPERTENSION: Status: ACTIVE | Noted: 2018-10-30

## 2024-06-23 PROBLEM — R73.01 IMPAIRED FASTING GLUCOSE: Status: ACTIVE | Noted: 2021-05-27

## 2024-06-23 PROBLEM — K21.9 GERD (GASTROESOPHAGEAL REFLUX DISEASE): Status: ACTIVE | Noted: 2024-06-23

## 2024-06-23 PROBLEM — R94.4 KIDNEY FUNCTION TEST ABNORMAL: Status: ACTIVE | Noted: 2024-02-09

## 2024-06-23 PROBLEM — E78.00 HYPERCHOLESTEREMIA: Status: ACTIVE | Noted: 2021-07-28

## 2024-06-23 PROBLEM — E03.8 SUBCLINICAL HYPOTHYROIDISM: Status: ACTIVE | Noted: 2022-02-28

## 2024-06-24 ENCOUNTER — APPOINTMENT (OUTPATIENT)
Dept: FAMILY MEDICINE | Facility: CLINIC | Age: 79
End: 2024-06-24
Payer: MEDICARE

## 2024-06-24 VITALS
SYSTOLIC BLOOD PRESSURE: 124 MMHG | TEMPERATURE: 97.3 F | BODY MASS INDEX: 25.33 KG/M2 | HEIGHT: 65 IN | WEIGHT: 152 LBS | DIASTOLIC BLOOD PRESSURE: 80 MMHG | HEART RATE: 55 BPM | OXYGEN SATURATION: 96 %

## 2024-06-24 DIAGNOSIS — Z12.11 ENCOUNTER FOR SCREENING FOR MALIGNANT NEOPLASM OF COLON: ICD-10-CM

## 2024-06-24 DIAGNOSIS — E78.00 PURE HYPERCHOLESTEROLEMIA, UNSPECIFIED: ICD-10-CM

## 2024-06-24 DIAGNOSIS — M25.512 PAIN IN LEFT SHOULDER: ICD-10-CM

## 2024-06-24 DIAGNOSIS — R94.4 ABNORMAL RESULTS OF KIDNEY FUNCTION STUDIES: ICD-10-CM

## 2024-06-24 DIAGNOSIS — R73.01 IMPAIRED FASTING GLUCOSE: ICD-10-CM

## 2024-06-24 DIAGNOSIS — E03.8 OTHER SPECIFIED HYPOTHYROIDISM: ICD-10-CM

## 2024-06-24 DIAGNOSIS — K21.9 GASTRO-ESOPHAGEAL REFLUX DISEASE W/OUT ESOPHAGITIS: ICD-10-CM

## 2024-06-24 DIAGNOSIS — I10 ESSENTIAL (PRIMARY) HYPERTENSION: ICD-10-CM

## 2024-06-24 PROCEDURE — G2211 COMPLEX E/M VISIT ADD ON: CPT

## 2024-06-24 PROCEDURE — 99215 OFFICE O/P EST HI 40 MIN: CPT

## 2024-06-24 PROCEDURE — 36415 COLL VENOUS BLD VENIPUNCTURE: CPT

## 2024-06-24 RX ORDER — POTASSIUM CHLORIDE 750 MG/1
10 CAPSULE, EXTENDED RELEASE ORAL
Qty: 45 | Refills: 3 | Status: ACTIVE | COMMUNITY
Start: 2023-10-03 | End: 1900-01-01

## 2024-06-24 RX ORDER — LOSARTAN POTASSIUM 50 MG/1
50 TABLET, FILM COATED ORAL
Qty: 180 | Refills: 3 | Status: ACTIVE | COMMUNITY
Start: 2024-02-14 | End: 1900-01-01

## 2024-06-24 RX ORDER — HYDROCHLOROTHIAZIDE 25 MG/1
25 TABLET ORAL
Qty: 90 | Refills: 3 | Status: ACTIVE | COMMUNITY
Start: 2024-02-28 | End: 1900-01-01

## 2024-06-24 NOTE — HEALTH RISK ASSESSMENT
[No falls in past year] : Patient reported no falls in the past year [0] : 2) Feeling down, depressed, or hopeless: Not at all (0) [PHQ-2 Negative - No further assessment needed] : PHQ-2 Negative - No further assessment needed [Never] : Never [[id number]] : 0

## 2024-06-24 NOTE — PLAN
[FreeTextEntry1] : Continue all medications as prescribed. Check labs as above. Will adjust any medications based upon lab results.  Revd/recommended Tdap booster, PCV 20 and Shingrix series; she defers on all for now  Revd/recommended she schedule mammogram and DEXA as is overdue for both. Updated rxs given and cc'ing results to Dr. White.   Reviewed pros/cons of Cologuard and Colonoscopy. She is willing to start with Cologuard test as initial screen and understands if is positive will need to do colonoscopy.    BP goal is <=135/85 on average on home checks. Advised to let us know if BPs are above goal on home checks.  Lifestyle measures to optimize BP reviewed, including regular exercise, adequate sleep, Mediterranean or DASH style clean eating, mindfulness/meditation, magnesium 100-400 mg supplementation, avoid NSAIDS, stress management techniques etc.  Common causes of lower extremity edema (incl venous valvular dysfunction/insufficiency) reviewed. Limit salt intake, hydrate well, Elevate legs above heart level 20 min TID if possible, wear compression socks, do calf pump exercises and keep as physically active as possible to minimize edema. Follow up or see cardiologist or vascular surgeon for further evaluation and testing if increased/new symptoms or if symptoms not improving with above measures.  Reviewed diagnosis of impaired fasting glucose (pre-diabetes) and risk for progression to diabetes. Reviewed dietary/lifestyle measures that can help to improve glucose and A1C levels over time and decrease risk for progression to diabetes, including eating cleanly (eg Mediterranean style eating plan), limiting/avoiding white flour carbohydrates and added sugars/sweeteners, pairing proteins with carbohydrates, higher fiber intake in diet, exercising regularly including cardio and strength training, achieving and maintaining a normal/near normal weight/BMI etc. We will monitor glucose and HgbA1C trends over time.  LDL goal <=100 ideally. Reviewed LDL goal and ASCVD risk estimate and factors that go into this calculation. Reviewed risks/benefits of statin med. Reviewed red yeast rice RYR (600-1200 mg daily) risks/benefits as an alternative to statin meds if not ready to consider statin meds. Recommended excellent hydration +/- co Q10 (100-400 mg daily) to decrease risk for statin (or RYR) related myalgias.  Reviewed that her lipids and ASCVD risk levels have been in ranges where cholesterol lowering therapy is strongly recommended. If LDL not at goal on these labs I recommend she d/c RYR and try Rx med. Given her intolerance to even low doses of several different statin meds I recommend she d/w Dr. Simpson re statin alternatives, such as PCSK 9 inhibitor meds.   Discussed clean eating (eg Mediterranean style plant based eating plan) and regular exercise/staying as physically active as possible. Include balance exercises and strength training and core strengthening exercises for bone health and to decrease risk for falls.  Recommended Tylenol XS or Arthritis 1-2 pills BID-TID if helpful, avoid NSAIDs given age/HTN/renal functionand revd r/b/se of NSAIDs incl CV, renal and GI etc, regular stretching, heat/ice prn, consider turmeric supplementation, consider CBD cream or oral options, gentle yoga/chair yoga, Pilates, strengthen core muscles, consider chiro and/or massage and/or acupuncture. If these measures are not helpful enough then consider consultation with ortho and/or pain  for further eval and treatment. Will be seeing ortho re L should pain/limited ROM  Recommended heat, stretching, topical salves, ROM exercises, good ergonomics with screens and good posture, gentle yoga, massage, can consider chiropractic intervention if desired/helpful, can consider acupuncture/cupping etc. If symptoms are not improving/resolving with these measures then can consider trial of PT and/or see orthopedist for further evaluation and treatment.  Reviewed importance of good self care (e.g. meditation, yoga, adequate rest, regular exercise, magnesium, clean eating, etc.).  Follow up with specialists as recommended by them.  Schedule CPE and RPA visit (chol, IFG, HTN etc ) and repeat fasting labs in about 6 months  Face-to-face time spent with patient, over half in discussion of the above diagnoses and treatment plan: 40 minutes.

## 2024-06-24 NOTE — HISTORY OF PRESENT ILLNESS
[FreeTextEntry1] : ENIO YANCEY is a 78 year old female here for a follow up visit. [de-identified] : Ms. Fulton has HTN, high cholesterol, IFG, GERD. She also has mildly abnormal renal function (elevated creatinine, low GFR).  She is UTD on follow up and recommended testing with Dr. Simpson (card).  Mrs. Fulton has not been able to tolerate statin meds in the past (Atorvastatin, Rosuvastatin).  She repeated labs 2024: , , HDL 47, ; 10 yr ASCVD risk est at 23.86%. H/H improved to wnl with better hydration so she should continue good daily hydration. Glu is 100. A1C is 5.8, stable. Creatinine 1.38 GFR 40 and that is worse than usual for her. Rest of labs are wnl.  Initially was though she was not well hydrated due to illness to explain the 2024 creat/GFR results. However 3/2024 rpt create /GFR still out of range so Dyazide was stopped.  Unfortunately that resulted in elevated of BPs and incr LE edema so HCTZ 12.5 mg was added back in and Losartan incr to 50 mg BID Rpt labs 3/2024 showed minimally abnormal renal function but back to usual mildly abnormal and acceptable ranges. Edema was still an issue so we incr HCTZ to 25 mg mid 2024. 24 repeat BMP stable/improved on HCTZ 25 plus Losartan 50 mg BID (and K 10 MEq QOD) and her Bps/Edema were improved  I had recommended at 2024 visit that she d/w Dr. Simpson re is she is a candidate for PCSK9 inhibitor med since intolerant to statins. She preferred to try RYR before considering trial of PCSK-9 med.   She updates me today that  Did not yet add RYR as was concerned about myalgias/joint aches as had these to a severe degree with statin med in past. Would rather try RYR prior to consideration of PCSK9 inhibitor med though so will give the RYR a try after we disc again today    She saw Dr. White for updated gyn visit 2023. He ordered mammo and DEXA. Did not get to do yet and those orders . I entered new orders (cc Dr. White) and she will sched for near future  Trying to drink 64oz/day of water   L shoulder/arm pain has been bothering her more over time; present x yrs, worsening x 6-12 mos. Fell years ago and that was initial injury. Also exacerbated  her shoulder about 2 yrs ago with heavy furniture lifting. Is RHD. Feels pain in post shoulder/upper back  region and radiates down L arm at times Has tried. Has limitation in ROM shoulder. Pain wakes her at night. Uses theragun which sometimes helps. Takes occas Tylenol (1 pill on some days).   Did not yet get to do baseline CRC screen though is willing to do now. We disc options incl colonoscopy and Cologuard again today. She would like to start with Cologuard  Under higher stress as her daughter had to go back on dialysis. She is on transplant list again to see if can get a second kidney transplant

## 2024-06-24 NOTE — ASSESSMENT
[FreeTextEntry1] : ENIO FULTON is a 78 year old female here for a physical exam.  She is also here to follow up on medical issues as noted above.  Ms. Fulton has HTN, high cholesterol, IFG, GERD. She also has abnormal renal function (elevated creatinine, low GFR) though mild and stable with HCTZ med which signif helps her LE edema and HTN so we will tolerate mildly low GFR/elevated Creat as long as relatively stable.  L shoulder pain/ROM limitation, worsening over time. ?Rotator cuff strain/tear +/- component of adhesive capsulitis now. Given worsening and signif life interfering sxs recommend she se ortho/hand specialist for further eval. Her family has been cared for by Dr. Gillis et al so she will schedule with them

## 2024-06-24 NOTE — REVIEW OF SYSTEMS
[Lower Ext Edema] : lower extremity edema [Joint Pain] : joint pain [Joint Stiffness] : joint stiffness [Muscle Pain] : muscle pain [Back Pain] : back pain [Negative] : Heme/Lymph [de-identified] : s/p several mohs surgeries and excision of an early stage melanoma [Chest Pain] : no chest pain [Palpitations] : no palpitations [Leg Claudication] : no leg claudication [FreeTextEntry5] : +varicose veins, some edema at end of day, better in AM and better back on HCTZ [FreeTextEntry9] : joint pain/stiffness that she is managing with walking, keeping as active as she can ; L upper back/post shoulder pain as noted in HPI

## 2024-06-24 NOTE — PHYSICAL EXAM
[No Acute Distress] : no acute distress [Well Nourished] : well nourished [Well Developed] : well developed [Well-Appearing] : well-appearing [Normal Sclera/Conjunctiva] : normal sclera/conjunctiva [EOMI] : extraocular movements intact [Normal Outer Ear/Nose] : the outer ears and nose were normal in appearance [Normal Oropharynx] : the oropharynx was normal [No Lymphadenopathy] : no lymphadenopathy [No JVD] : no jugular venous distention [Supple] : supple [Thyroid Normal, No Nodules] : the thyroid was normal and there were no nodules present [No Respiratory Distress] : no respiratory distress  [No Accessory Muscle Use] : no accessory muscle use [Clear to Auscultation] : lungs were clear to auscultation bilaterally [Normal Rate] : normal rate  [Regular Rhythm] : with a regular rhythm [Normal S1, S2] : normal S1 and S2 [No Murmur] : no murmur heard [No Carotid Bruits] : no carotid bruits [Pedal Pulses Present] : the pedal pulses are present [No Edema] : there was no peripheral edema [No Extremity Clubbing/Cyanosis] : no extremity clubbing/cyanosis [Soft] : abdomen soft [Non Tender] : non-tender [Non-distended] : non-distended [No HSM] : no HSM [No Masses] : no abdominal mass palpated [Normal Bowel Sounds] : normal bowel sounds [Normal Posterior Cervical Nodes] : no posterior cervical lymphadenopathy [Normal Anterior Cervical Nodes] : no anterior cervical lymphadenopathy [No Spinal Tenderness] : no spinal tenderness [No Joint Swelling] : no joint swelling [Grossly Normal Strength/Tone] : grossly normal strength/tone [No Rash] : no rash [Coordination Grossly Intact] : coordination grossly intact [Normal Gait] : normal gait [No Focal Deficits] : no focal deficits [Normal Affect] : the affect was normal [Normal Insight/Judgement] : insight and judgment were intact [de-identified] : well healed surg scar volar R forearm from wide excision melanoma [de-identified] : mod varicosities BLE; tr BLE edema [de-identified] : + TTP over L trapezius /upper back mm. No anter shoulder pain [de-identified] : no s/sxs acute synovitis; 5/5 BUE motor strength; decreased ROM L shoulder IR, abduction , ER ; +TTP post shoulder laterally near point of shoulder

## 2024-06-25 LAB
ALBUMIN SERPL ELPH-MCNC: 4 G/DL
ALP BLD-CCNC: 81 U/L
ALT SERPL-CCNC: 11 U/L
ANION GAP SERPL CALC-SCNC: 14 MMOL/L
AST SERPL-CCNC: 15 U/L
BILIRUB SERPL-MCNC: 0.6 MG/DL
BUN SERPL-MCNC: 12 MG/DL
CALCIUM SERPL-MCNC: 9.7 MG/DL
CHLORIDE SERPL-SCNC: 97 MMOL/L
CHOLEST SERPL-MCNC: 225 MG/DL
CO2 SERPL-SCNC: 26 MMOL/L
CREAT SERPL-MCNC: 0.97 MG/DL
EGFR: 61 ML/MIN/1.73M2
ESTIMATED AVERAGE GLUCOSE: 117 MG/DL
GLUCOSE SERPL-MCNC: 86 MG/DL
HBA1C MFR BLD HPLC: 5.7 %
HDLC SERPL-MCNC: 54 MG/DL
LDLC SERPL CALC-MCNC: 140 MG/DL
NONHDLC SERPL-MCNC: 171 MG/DL
POTASSIUM SERPL-SCNC: 3.7 MMOL/L
PROT SERPL-MCNC: 6.4 G/DL
SODIUM SERPL-SCNC: 136 MMOL/L
TRIGL SERPL-MCNC: 174 MG/DL

## 2024-07-11 ENCOUNTER — APPOINTMENT (OUTPATIENT)
Dept: FAMILY MEDICINE | Facility: CLINIC | Age: 79
End: 2024-07-11
Payer: MEDICARE

## 2024-07-11 VITALS
DIASTOLIC BLOOD PRESSURE: 84 MMHG | WEIGHT: 153 LBS | SYSTOLIC BLOOD PRESSURE: 142 MMHG | OXYGEN SATURATION: 96 % | TEMPERATURE: 97 F | HEIGHT: 65 IN | BODY MASS INDEX: 25.49 KG/M2 | HEART RATE: 73 BPM

## 2024-07-11 DIAGNOSIS — R68.2 DRY MOUTH, UNSPECIFIED: ICD-10-CM

## 2024-07-11 PROCEDURE — 99213 OFFICE O/P EST LOW 20 MIN: CPT

## 2024-08-09 ENCOUNTER — TRANSCRIPTION ENCOUNTER (OUTPATIENT)
Age: 79
End: 2024-08-09

## 2024-12-10 ENCOUNTER — APPOINTMENT (OUTPATIENT)
Dept: FAMILY MEDICINE | Facility: CLINIC | Age: 79
End: 2024-12-10
Payer: MEDICARE

## 2024-12-10 VITALS
BODY MASS INDEX: 25.83 KG/M2 | TEMPERATURE: 97.3 F | HEART RATE: 60 BPM | DIASTOLIC BLOOD PRESSURE: 82 MMHG | HEIGHT: 65 IN | SYSTOLIC BLOOD PRESSURE: 130 MMHG | WEIGHT: 155 LBS | OXYGEN SATURATION: 97 %

## 2024-12-10 PROCEDURE — 36415 COLL VENOUS BLD VENIPUNCTURE: CPT

## 2024-12-10 PROCEDURE — 99213 OFFICE O/P EST LOW 20 MIN: CPT

## 2024-12-12 DIAGNOSIS — E87.6 HYPOKALEMIA: ICD-10-CM

## 2024-12-12 DIAGNOSIS — E87.1 HYPO-OSMOLALITY AND HYPONATREMIA: ICD-10-CM

## 2024-12-12 LAB
ALBUMIN SERPL ELPH-MCNC: 3.9 G/DL
ALP BLD-CCNC: 100 U/L
ALT SERPL-CCNC: 16 U/L
ANION GAP SERPL CALC-SCNC: 15 MMOL/L
AST SERPL-CCNC: 17 U/L
BILIRUB SERPL-MCNC: 0.4 MG/DL
BUN SERPL-MCNC: 16 MG/DL
CALCIUM SERPL-MCNC: 9.8 MG/DL
CHLORIDE SERPL-SCNC: 99 MMOL/L
CO2 SERPL-SCNC: 24 MMOL/L
CREAT SERPL-MCNC: 0.97 MG/DL
EGFR: 60 ML/MIN/1.73M2
GLUCOSE SERPL-MCNC: 94 MG/DL
POTASSIUM SERPL-SCNC: 3.8 MMOL/L
PROT SERPL-MCNC: 6.2 G/DL
SODIUM SERPL-SCNC: 139 MMOL/L

## 2025-01-15 ENCOUNTER — APPOINTMENT (OUTPATIENT)
Dept: FAMILY MEDICINE | Facility: CLINIC | Age: 80
End: 2025-01-15

## 2025-01-15 ENCOUNTER — APPOINTMENT (OUTPATIENT)
Dept: FAMILY MEDICINE | Facility: CLINIC | Age: 80
End: 2025-01-15
Payer: MEDICARE

## 2025-01-15 PROCEDURE — 36415 COLL VENOUS BLD VENIPUNCTURE: CPT

## 2025-02-04 ENCOUNTER — APPOINTMENT (OUTPATIENT)
Dept: FAMILY MEDICINE | Facility: CLINIC | Age: 80
End: 2025-02-04
Payer: MEDICARE

## 2025-02-04 PROCEDURE — 36415 COLL VENOUS BLD VENIPUNCTURE: CPT

## 2025-02-27 DIAGNOSIS — Z13.820 ENCOUNTER FOR SCREENING FOR OSTEOPOROSIS: ICD-10-CM

## 2025-03-03 ENCOUNTER — LABORATORY RESULT (OUTPATIENT)
Age: 80
End: 2025-03-03

## 2025-03-03 ENCOUNTER — APPOINTMENT (OUTPATIENT)
Dept: FAMILY MEDICINE | Facility: CLINIC | Age: 80
End: 2025-03-03
Payer: MEDICARE

## 2025-03-03 VITALS
HEIGHT: 64.5 IN | OXYGEN SATURATION: 99 % | WEIGHT: 164 LBS | BODY MASS INDEX: 27.66 KG/M2 | HEART RATE: 60 BPM | SYSTOLIC BLOOD PRESSURE: 124 MMHG | TEMPERATURE: 97.6 F | RESPIRATION RATE: 18 BRPM | DIASTOLIC BLOOD PRESSURE: 78 MMHG

## 2025-03-03 DIAGNOSIS — Z00.00 ENCOUNTER FOR GENERAL ADULT MEDICAL EXAMINATION W/OUT ABNORMAL FINDINGS: ICD-10-CM

## 2025-03-03 DIAGNOSIS — I10 ESSENTIAL (PRIMARY) HYPERTENSION: ICD-10-CM

## 2025-03-03 DIAGNOSIS — Z84.1 FAMILY HISTORY OF DISORDERS OF KIDNEY AND URETER: ICD-10-CM

## 2025-03-03 DIAGNOSIS — E78.00 PURE HYPERCHOLESTEROLEMIA, UNSPECIFIED: ICD-10-CM

## 2025-03-03 DIAGNOSIS — K21.9 GASTRO-ESOPHAGEAL REFLUX DISEASE W/OUT ESOPHAGITIS: ICD-10-CM

## 2025-03-03 DIAGNOSIS — E87.6 HYPOKALEMIA: ICD-10-CM

## 2025-03-03 DIAGNOSIS — R94.4 ABNORMAL RESULTS OF KIDNEY FUNCTION STUDIES: ICD-10-CM

## 2025-03-03 DIAGNOSIS — R73.01 IMPAIRED FASTING GLUCOSE: ICD-10-CM

## 2025-03-03 DIAGNOSIS — M25.561 PAIN IN RIGHT KNEE: ICD-10-CM

## 2025-03-03 PROCEDURE — 36415 COLL VENOUS BLD VENIPUNCTURE: CPT

## 2025-03-03 PROCEDURE — G0439: CPT

## 2025-03-05 LAB
ALBUMIN SERPL ELPH-MCNC: 4.1 G/DL
ALP BLD-CCNC: 89 U/L
ALT SERPL-CCNC: 15 U/L
ANION GAP SERPL CALC-SCNC: 16 MMOL/L
AST SERPL-CCNC: 15 U/L
BASOPHILS # BLD AUTO: 0.05 K/UL
BASOPHILS NFR BLD AUTO: 0.9 %
BILIRUB SERPL-MCNC: 0.5 MG/DL
BUN SERPL-MCNC: 22 MG/DL
CALCIUM SERPL-MCNC: 9.3 MG/DL
CHLORIDE SERPL-SCNC: 100 MMOL/L
CHOLEST SERPL-MCNC: 239 MG/DL
CO2 SERPL-SCNC: 25 MMOL/L
CREAT SERPL-MCNC: 1.08 MG/DL
EGFR: 53 ML/MIN/1.73M2
EOSINOPHIL # BLD AUTO: 0.09 K/UL
EOSINOPHIL NFR BLD AUTO: 1.6 %
ESTIMATED AVERAGE GLUCOSE: 114 MG/DL
GLUCOSE SERPL-MCNC: 96 MG/DL
HBA1C MFR BLD HPLC: 5.6 %
HCT VFR BLD CALC: 46.7 %
HDLC SERPL-MCNC: 52 MG/DL
HGB BLD-MCNC: 15.2 G/DL
IMM GRANULOCYTES NFR BLD AUTO: 0.4 %
LDLC SERPL CALC-MCNC: 145 MG/DL
LYMPHOCYTES # BLD AUTO: 1.37 K/UL
LYMPHOCYTES NFR BLD AUTO: 24.8 %
MAN DIFF?: NORMAL
MCHC RBC-ENTMCNC: 30.5 PG
MCHC RBC-ENTMCNC: 32.5 G/DL
MCV RBC AUTO: 93.6 FL
MONOCYTES # BLD AUTO: 0.41 K/UL
MONOCYTES NFR BLD AUTO: 7.4 %
NEUTROPHILS # BLD AUTO: 3.58 K/UL
NEUTROPHILS NFR BLD AUTO: 64.9 %
NONHDLC SERPL-MCNC: 186 MG/DL
PLATELET # BLD AUTO: 179 K/UL
POTASSIUM SERPL-SCNC: 3.6 MMOL/L
PROT SERPL-MCNC: 6.2 G/DL
RBC # BLD: 4.99 M/UL
RBC # FLD: 12.7 %
SODIUM SERPL-SCNC: 141 MMOL/L
TRIGL SERPL-MCNC: 227 MG/DL
TSH SERPL-ACNC: 5.36 UIU/ML
WBC # FLD AUTO: 5.52 K/UL

## 2025-06-27 ENCOUNTER — RX RENEWAL (OUTPATIENT)
Age: 80
End: 2025-06-27

## 2025-07-07 ENCOUNTER — RX RENEWAL (OUTPATIENT)
Age: 80
End: 2025-07-07

## 2025-07-15 ENCOUNTER — NON-APPOINTMENT (OUTPATIENT)
Age: 80
End: 2025-07-15

## 2025-07-16 ENCOUNTER — APPOINTMENT (OUTPATIENT)
Dept: FAMILY MEDICINE | Facility: CLINIC | Age: 80
End: 2025-07-16
Payer: MEDICARE

## 2025-07-16 VITALS
HEIGHT: 64.5 IN | HEART RATE: 57 BPM | SYSTOLIC BLOOD PRESSURE: 138 MMHG | TEMPERATURE: 97.8 F | DIASTOLIC BLOOD PRESSURE: 70 MMHG | OXYGEN SATURATION: 96 % | WEIGHT: 166 LBS | BODY MASS INDEX: 28 KG/M2

## 2025-07-16 PROCEDURE — 99401 PREV MED CNSL INDIV APPRX 15: CPT

## 2025-07-16 PROCEDURE — G0537: CPT

## 2025-07-16 PROCEDURE — 99215 OFFICE O/P EST HI 40 MIN: CPT

## 2025-07-16 PROCEDURE — 36415 COLL VENOUS BLD VENIPUNCTURE: CPT

## 2025-07-16 PROCEDURE — G0447 BEHAVIOR COUNSEL OBESITY 15M: CPT

## 2025-07-16 PROCEDURE — G2211 COMPLEX E/M VISIT ADD ON: CPT

## 2025-07-17 RX ORDER — ROSUVASTATIN CALCIUM 5 MG/1
5 TABLET, FILM COATED ORAL
Qty: 90 | Refills: 3 | Status: ACTIVE | COMMUNITY
Start: 2025-07-17 | End: 1900-01-01

## 2025-07-18 LAB
FERRITIN SERPL-MCNC: 44 NG/ML
FOLATE SERPL-MCNC: 10.2 NG/ML
IRON SATN MFR SERPL: 35 %
IRON SERPL-MCNC: 125 UG/DL
TIBC SERPL-MCNC: 362 UG/DL
UIBC SERPL-MCNC: 237 UG/DL
VIT B12 SERPL-MCNC: 250 PG/ML